# Patient Record
Sex: MALE | Race: WHITE | NOT HISPANIC OR LATINO | Employment: FULL TIME | URBAN - METROPOLITAN AREA
[De-identification: names, ages, dates, MRNs, and addresses within clinical notes are randomized per-mention and may not be internally consistent; named-entity substitution may affect disease eponyms.]

---

## 2020-09-18 ENCOUNTER — PROCEDURE VISIT (OUTPATIENT)
Dept: PAIN MEDICINE | Facility: CLINIC | Age: 55
End: 2020-09-18
Payer: COMMERCIAL

## 2020-09-18 VITALS
DIASTOLIC BLOOD PRESSURE: 91 MMHG | BODY MASS INDEX: 31.16 KG/M2 | TEMPERATURE: 95.6 F | SYSTOLIC BLOOD PRESSURE: 159 MMHG | HEART RATE: 94 BPM | WEIGHT: 210.4 LBS | HEIGHT: 69 IN

## 2020-09-18 DIAGNOSIS — M25.551 HIP PAIN, ACUTE, RIGHT: Primary | ICD-10-CM

## 2020-09-18 DIAGNOSIS — M16.31 OSTEOARTHRITIS RESULTING FROM RIGHT HIP DYSPLASIA: Primary | ICD-10-CM

## 2020-09-18 DIAGNOSIS — M25.551 PAIN IN RIGHT HIP: ICD-10-CM

## 2020-09-18 PROCEDURE — 20611 DRAIN/INJ JOINT/BURSA W/US: CPT | Performed by: ANESTHESIOLOGY

## 2020-09-18 PROCEDURE — 99203 OFFICE O/P NEW LOW 30 MIN: CPT | Performed by: ORTHOPAEDIC SURGERY

## 2020-09-18 RX ORDER — ATORVASTATIN CALCIUM 20 MG/1
20 TABLET, FILM COATED ORAL DAILY
COMMUNITY
Start: 2020-07-30

## 2020-09-18 RX ORDER — METHYLPREDNISOLONE ACETATE 80 MG/ML
80 INJECTION, SUSPENSION INTRA-ARTICULAR; INTRALESIONAL; INTRAMUSCULAR; SOFT TISSUE ONCE
Status: COMPLETED | OUTPATIENT
Start: 2020-09-18 | End: 2020-09-18

## 2020-09-18 RX ORDER — BUPIVACAINE HYDROCHLORIDE 2.5 MG/ML
4 INJECTION, SOLUTION EPIDURAL; INFILTRATION; INTRACAUDAL ONCE
Status: COMPLETED | OUTPATIENT
Start: 2020-09-18 | End: 2020-09-18

## 2020-09-18 RX ADMIN — METHYLPREDNISOLONE ACETATE 80 MG: 80 INJECTION, SUSPENSION INTRA-ARTICULAR; INTRALESIONAL; INTRAMUSCULAR; SOFT TISSUE at 15:40

## 2020-09-18 RX ADMIN — BUPIVACAINE HYDROCHLORIDE 4 ML: 2.5 INJECTION, SOLUTION EPIDURAL; INFILTRATION; INTRACAUDAL at 15:39

## 2020-09-18 NOTE — PROGRESS NOTES
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     2  Pain in right hip       Scribe Attestation    I,:   Logan Pillai am acting as a scribe while in the presence of the attending physician :        I,:   Elisabeth Monroy, DO personally performed the services described in this documentation    as scribed in my presence :          Ayo Anderson is a very pleasant 58-year-old male who presents today for initial evaluation of his chronic right hip pain  After reviewing his imaging and performing a thorough history and physical exam I explained that he is suffering with severe osteoarthritis in his right hip secondary to his underlying hip dysplasia  Based on the progression of his underlying disease and his difficulty with age-appropriate activities of daily living and enjoyment and the demands of his employment I explained that he is indicated for a right total hip arthroplasty and would be an excellent candidate for the direct anterior approach  I provided him with literature regarding this today in the office  We spent time discussing the procedure and the associated recovery  He does state that he would like to move forward with the procedure and has already identified January or February as an ideal time for him  Given the timing, I did offer a corticosteroid injection for symptomatic relief until he is able to undergo the procedure  He understands that following the injection, he will be contraindicated for surgery for three months  Our spine and pain specialist Dr Korey Flowers was able to accommodate him in the office today and will administer an ultrasound-guided right hip intra-articular corticosteroid injection  He will return five weeks prior to his desired surgery date for preoperative examination and surgical consent  All of his questions and concerns were addressed today in the office  We will see him back for preoperative planning and consent      Subjective: Initial evaluation for chronic right hip pain    Patient ID: Raimundo Monroy is a 54 y o  male who presents today for initial evaluation of his chronic right hip pain  He states that this has bothered him regularly for the last three or four years  He describes a sharp pain in his groin that can radiate at times into his upper leg to the medial aspect of his right knee  He finds his pain is worse with activity and only somewhat better at rest   He states that he avoids taking over-the-counter medications  He does work in construction as well as opening a [employer] and finds that his pain does interfere with the demands of his employment  He also struggles with activities of daily living such as dressing  He states today that he feels as though he has no quality of life  He denies any acute injury or trauma  He denies any distal paresthesias of the lower extremities  Review of Systems   Constitutional: Positive for activity change  Negative for chills, fever and unexpected weight change  HENT: Negative for hearing loss, nosebleeds and sore throat  Eyes: Negative for pain, redness and visual disturbance  Respiratory: Negative for cough, shortness of breath and wheezing  Cardiovascular: Negative for chest pain, palpitations and leg swelling  Gastrointestinal: Negative for abdominal pain, nausea and vomiting  Endocrine: Negative for polyphagia and polyuria  Genitourinary: Negative for dysuria and hematuria  Musculoskeletal: Positive for arthralgias and gait problem  Negative for myalgias  See HPI   Skin: Negative for rash and wound  Neurological: Negative for dizziness, numbness and headaches  Psychiatric/Behavioral: Negative for decreased concentration and suicidal ideas  The patient is not nervous/anxious            Past Medical History:   Diagnosis Date    Hypercholesteremia        Past Surgical History:   Procedure Laterality Date    KIDNEY STONE SURGERY Family History   Problem Relation Age of Onset    No Known Problems Mother     No Known Problems Father        Social History     Occupational History    Not on file   Tobacco Use    Smoking status: Never Smoker    Smokeless tobacco: Never Used   Substance and Sexual Activity    Alcohol use: Yes     Comment: occasionally    Drug use: Never    Sexual activity: Not on file         Current Outpatient Medications:     atorvastatin (LIPITOR) 20 mg tablet, Take 20 mg by mouth daily, Disp: , Rfl:     No Known Allergies    Objective:  Vitals:    09/18/20 1441   BP: 159/91   Pulse: 94   Temp: (!) 95 6 °F (35 3 °C)       Body mass index is 31 53 kg/m²  Right Hip Exam     Tenderness   The patient is experiencing tenderness in the anterior  Range of Motion   Right hip abduction: 40  Right hip adduction: 20  Right hip flexion: 100 with pain  Right hip external rotation: 40  Right hip internal rotation: 5  Muscle Strength   Flexion: 4/5     Tests   DEVI: positive    Other   Erythema: absent  Scars: absent  Sensation: normal  Pulse: present    Comments:  No overhanging abdominal pannus  Protuberant abdomen   [place]: positive  FADIR: positive              Physical Exam  Vitals signs and nursing note reviewed  Constitutional:       Appearance: He is well-developed  HENT:      Head: Normocephalic and atraumatic  Eyes:      Conjunctiva/sclera: Conjunctivae normal       Pupils: Pupils are equal, round, and reactive to light  Neck:      Musculoskeletal: Normal range of motion and neck supple  Cardiovascular:      Rate and Rhythm: Normal rate  Pulmonary:      Effort: Pulmonary effort is normal  No respiratory distress  Abdominal:      Palpations: Abdomen is soft  Musculoskeletal:      Comments: As noted in HPI   Skin:     General: Skin is warm and dry  Neurological:      Mental Status: He is alert and oriented to person, place, and time     Psychiatric:         Behavior: Behavior normal          I have personally reviewed pertinent films in PACS  X-ray of the right hip obtained from an outside source dated March 2020 reviewed demonstrating severe degenerative changes secondary to underlying dysplasia with joint space narrowing, sclerosis, and deformity of the femoral head  There is no acute fracture, dislocation, lytic or blastic lesion  X-rays of the left hip obtained at the same time also demonstrate mild underlying dysplasia without associated degenerative change  X-ray of the right knee obtained from an outside source dated March 2020 reviewed demonstrating mild age-appropriate degenerative change with sclerosis and early osteophytosis but well-preserved joint spaces

## 2020-09-18 NOTE — PROGRESS NOTES
ATTENDING PHYSICIAN:  Brooke Metcalf MD      PREPROCEDURE DIAGNOSIS:  Right hip pain  POSTPROCEDURE DIAGNOSIS: Right hip pain  PROCEDURE: Right intraarticular hip injection under ultrasound guidance  ANESTHESIA:  Local     ESTIMATED BLOOD LOSS:  Minimal     COMPLICATIONS:  None  LOCATION:  [facility] [employer] [facility]  CONSENT:  Today's procedure, its risks, benefits, and alternatives were explained in detail to the patient  Risks include, but are not limited bleeding, infection, hematoma formation, abscess formation, weakness, nerve irritation or damage, failure of the pain to improve, or potential worsening of the pain  The patient verbalized understanding and wished to proceed with the procedure  Written informed consent was thereby obtained  DESCRIPTION OF THE PROCEDURE:  After written informed consent was obtained, the patient was taken to the ultrasound suite and placed in the supine position  Anatomical landmarks were identified by way of ultrasound guidance  The patient's hip region was prepped using antiseptic solution and draped in the usual sterile fashion  Strict aseptic technique was utilized  A 25 gauge 2 inch needle was then advanced incrementally under ultrasound guidance towards the femoral neck until os was contacted and the joint space was entered  Subsequently, a solution consisting of 4 ml of 0 5% Bupivacaine mixed with 1 ml of Depo-Medrol 80 mg/ml was injected slowly  All needles were removed with the tips intact and hemostasis was maintained throughout  The patient tolerated the procedure well, and there were no apparent paresthesias or complications noted during or following this procedure  The antiseptic was wiped clean and Band-Aids were placed as appropriate    The patient was observed for an appropriate period of time during which the patient remained hemodynamically stable and neurovascularly intact, as the patient was prior to the procedure  The patient was subsequently discharged to home in stable condition with supervision  The patient was instructed to call the office in a few days for an update  Discharge instructions were provided  I was present and participated in all key and critical portions of this procedure      Nate Baltazar MD  9/18/2020  3:39 PM

## 2020-09-25 ENCOUNTER — TELEPHONE (OUTPATIENT)
Dept: PAIN MEDICINE | Facility: CLINIC | Age: 55
End: 2020-09-25

## 2020-11-19 ENCOUNTER — APPOINTMENT (OUTPATIENT)
Dept: RADIOLOGY | Facility: CLINIC | Age: 55
End: 2020-11-19
Payer: COMMERCIAL

## 2020-11-19 VITALS
HEART RATE: 84 BPM | WEIGHT: 214.2 LBS | DIASTOLIC BLOOD PRESSURE: 80 MMHG | HEIGHT: 69 IN | BODY MASS INDEX: 31.73 KG/M2 | SYSTOLIC BLOOD PRESSURE: 135 MMHG

## 2020-11-19 DIAGNOSIS — M25.551 RIGHT HIP PAIN: ICD-10-CM

## 2020-11-19 DIAGNOSIS — E78.5 HYPERLIPIDEMIA, UNSPECIFIED HYPERLIPIDEMIA TYPE: ICD-10-CM

## 2020-11-19 DIAGNOSIS — M16.31 OSTEOARTHRITIS RESULTING FROM RIGHT HIP DYSPLASIA: Primary | ICD-10-CM

## 2020-11-19 DIAGNOSIS — Z11.59 SCREENING FOR VIRAL DISEASE: ICD-10-CM

## 2020-11-19 DIAGNOSIS — Z01.812 PRE-OPERATIVE LABORATORY EXAMINATION: ICD-10-CM

## 2020-11-19 DIAGNOSIS — M16.31 OSTEOARTHRITIS RESULTING FROM RIGHT HIP DYSPLASIA: ICD-10-CM

## 2020-11-19 PROCEDURE — 99214 OFFICE O/P EST MOD 30 MIN: CPT | Performed by: ORTHOPAEDIC SURGERY

## 2020-11-19 PROCEDURE — 86901 BLOOD TYPING SEROLOGIC RH(D): CPT | Performed by: ORTHOPAEDIC SURGERY

## 2020-11-19 PROCEDURE — 73502 X-RAY EXAM HIP UNI 2-3 VIEWS: CPT

## 2020-11-19 PROCEDURE — 86900 BLOOD TYPING SEROLOGIC ABO: CPT | Performed by: ORTHOPAEDIC SURGERY

## 2020-11-19 PROCEDURE — 86850 RBC ANTIBODY SCREEN: CPT | Performed by: ORTHOPAEDIC SURGERY

## 2020-11-19 RX ORDER — FOLIC ACID 1 MG/1
1 TABLET ORAL DAILY
Qty: 30 TABLET | Refills: 0 | Status: SHIPPED | OUTPATIENT
Start: 2020-11-19 | End: 2021-02-09 | Stop reason: HOSPADM

## 2020-11-19 RX ORDER — ZINC SULFATE 50(220)MG
220 CAPSULE ORAL DAILY
Qty: 30 CAPSULE | Refills: 0 | Status: SHIPPED | OUTPATIENT
Start: 2020-11-19 | End: 2021-02-09 | Stop reason: HOSPADM

## 2020-11-19 RX ORDER — CHLORHEXIDINE GLUCONATE 0.12 MG/ML
15 RINSE ORAL ONCE
Status: CANCELLED | OUTPATIENT
Start: 2021-02-08 | End: 2020-11-19

## 2020-11-19 RX ORDER — MELATONIN
1000 DAILY
Qty: 30 TABLET | Refills: 0 | Status: SHIPPED | OUTPATIENT
Start: 2020-11-19 | End: 2021-02-09 | Stop reason: HOSPADM

## 2020-11-19 RX ORDER — FERROUS SULFATE TAB EC 324 MG (65 MG FE EQUIVALENT) 324 (65 FE) MG
324 TABLET DELAYED RESPONSE ORAL
Qty: 30 TABLET | Refills: 0 | Status: SHIPPED | OUTPATIENT
Start: 2020-11-19 | End: 2021-02-09 | Stop reason: HOSPADM

## 2020-12-05 LAB
ABO GROUP BLD: NORMAL
ALBUMIN SERPL-MCNC: 5.1 G/DL (ref 3.8–4.9)
ALBUMIN/GLOB SERPL: 2.2 {RATIO} (ref 1.2–2.2)
ALP SERPL-CCNC: 54 IU/L (ref 39–117)
ALT SERPL-CCNC: 37 IU/L (ref 0–44)
APTT PPP: 25 SEC (ref 24–33)
AST SERPL-CCNC: 27 IU/L (ref 0–40)
BASOPHILS # BLD AUTO: 0 X10E3/UL (ref 0–0.2)
BASOPHILS NFR BLD AUTO: 0 %
BILIRUB SERPL-MCNC: 0.5 MG/DL (ref 0–1.2)
BLD GP AB SCN SERPL QL: NEGATIVE
BUN SERPL-MCNC: 15 MG/DL (ref 6–24)
BUN/CREAT SERPL: 16 (ref 9–20)
CALCIUM SERPL-MCNC: 9.8 MG/DL (ref 8.7–10.2)
CHLORIDE SERPL-SCNC: 101 MMOL/L (ref 96–106)
CO2 SERPL-SCNC: 27 MMOL/L (ref 20–29)
CREAT SERPL-MCNC: 0.91 MG/DL (ref 0.76–1.27)
EOSINOPHIL # BLD AUTO: 0.1 X10E3/UL (ref 0–0.4)
EOSINOPHIL NFR BLD AUTO: 2 %
ERYTHROCYTE [DISTWIDTH] IN BLOOD BY AUTOMATED COUNT: 12.1 % (ref 11.6–15.4)
GLOBULIN SER-MCNC: 2.3 G/DL (ref 1.5–4.5)
GLUCOSE SERPL-MCNC: 100 MG/DL (ref 65–99)
HCT VFR BLD AUTO: 45.8 % (ref 37.5–51)
HGB BLD-MCNC: 15.7 G/DL (ref 13–17.7)
IMM GRANULOCYTES # BLD: 0 X10E3/UL (ref 0–0.1)
IMM GRANULOCYTES NFR BLD: 0 %
INR PPP: 1 (ref 0.9–1.2)
LYMPHOCYTES # BLD AUTO: 2.3 X10E3/UL (ref 0.7–3.1)
LYMPHOCYTES NFR BLD AUTO: 40 %
MCH RBC QN AUTO: 30.9 PG (ref 26.6–33)
MCHC RBC AUTO-ENTMCNC: 34.3 G/DL (ref 31.5–35.7)
MCV RBC AUTO: 90 FL (ref 79–97)
MONOCYTES # BLD AUTO: 0.4 X10E3/UL (ref 0.1–0.9)
MONOCYTES NFR BLD AUTO: 7 %
NEUTROPHILS # BLD AUTO: 3 X10E3/UL (ref 1.4–7)
NEUTROPHILS NFR BLD AUTO: 51 %
PLATELET # BLD AUTO: 164 X10E3/UL (ref 150–450)
POTASSIUM SERPL-SCNC: 4.7 MMOL/L (ref 3.5–5.2)
PROT SERPL-MCNC: 7.4 G/DL (ref 6–8.5)
PROTHROMBIN TIME: 10.6 SEC (ref 9.1–12)
RBC # BLD AUTO: 5.08 X10E6/UL (ref 4.14–5.8)
RH BLD: POSITIVE
SL AMB EGFR AFRICAN AMERICAN: 109 ML/MIN/1.73
SL AMB EGFR NON AFRICAN AMERICAN: 95 ML/MIN/1.73
SODIUM SERPL-SCNC: 141 MMOL/L (ref 134–144)
WBC # BLD AUTO: 5.8 X10E3/UL (ref 3.4–10.8)

## 2020-12-08 ENCOUNTER — TELEPHONE (OUTPATIENT)
Dept: OBGYN CLINIC | Facility: CLINIC | Age: 55
End: 2020-12-08

## 2020-12-21 ENCOUNTER — ANESTHESIA EVENT (OUTPATIENT)
Dept: PERIOP | Facility: HOSPITAL | Age: 55
End: 2020-12-21
Payer: COMMERCIAL

## 2020-12-29 ENCOUNTER — HOSPITAL ENCOUNTER (OUTPATIENT)
Dept: RADIOLOGY | Facility: HOSPITAL | Age: 55
Discharge: HOME | End: 2020-12-29
Payer: COMMERCIAL

## 2020-12-29 ENCOUNTER — OFFICE VISIT (OUTPATIENT)
Dept: LAB | Facility: HOSPITAL | Age: 55
End: 2020-12-29
Payer: COMMERCIAL

## 2020-12-29 ENCOUNTER — APPOINTMENT (OUTPATIENT)
Dept: LAB | Facility: HOSPITAL | Age: 55
End: 2020-12-29
Attending: ORTHOPAEDIC SURGERY
Payer: COMMERCIAL

## 2020-12-29 ENCOUNTER — TRANSCRIBE ORDERS (OUTPATIENT)
Dept: ADMINISTRATIVE | Facility: HOSPITAL | Age: 55
End: 2020-12-29

## 2020-12-29 ENCOUNTER — APPOINTMENT (OUTPATIENT)
Dept: PREADMISSION TESTING | Facility: HOSPITAL | Age: 55
End: 2020-12-29
Payer: COMMERCIAL

## 2020-12-29 DIAGNOSIS — Z01.818 PREOP TESTING: Primary | ICD-10-CM

## 2020-12-29 DIAGNOSIS — M16.31 OSTEOARTHRITIS RESULTING FROM RIGHT HIP DYSPLASIA: ICD-10-CM

## 2020-12-29 DIAGNOSIS — Z01.818 PREOP TESTING: ICD-10-CM

## 2020-12-29 DIAGNOSIS — M25.551 RIGHT HIP PAIN: ICD-10-CM

## 2020-12-29 DIAGNOSIS — Z01.812 PRE-OPERATIVE LABORATORY EXAMINATION: ICD-10-CM

## 2020-12-29 LAB
ATRIAL RATE: 69 BPM
EST. AVERAGE GLUCOSE BLD GHB EST-MCNC: 111 MG/DL
HBA1C MFR BLD: 5.5 %
P AXIS: 45 DEGREES
PR INTERVAL: 146 MS
QRS AXIS: 53 DEGREES
QRSD INTERVAL: 104 MS
QT INTERVAL: 398 MS
QTC INTERVAL: 426 MS
T WAVE AXIS: 21 DEGREES
VENTRICULAR RATE: 69 BPM

## 2020-12-29 PROCEDURE — 93005 ELECTROCARDIOGRAM TRACING: CPT

## 2020-12-29 PROCEDURE — 36415 COLL VENOUS BLD VENIPUNCTURE: CPT

## 2020-12-29 PROCEDURE — 71046 X-RAY EXAM CHEST 2 VIEWS: CPT

## 2020-12-29 PROCEDURE — 87081 CULTURE SCREEN ONLY: CPT

## 2020-12-29 PROCEDURE — 83036 HEMOGLOBIN GLYCOSYLATED A1C: CPT

## 2020-12-29 PROCEDURE — 93010 ELECTROCARDIOGRAM REPORT: CPT | Performed by: INTERNAL MEDICINE

## 2020-12-29 NOTE — PRE-PROCEDURE INSTRUCTIONS
My Surgical Experience    The following information was developed to assist you to prepare for your operation  What do I need to do before coming to the hospital?   Arrange for a responsible person to drive you to and from the hospital    Arrange care for your children at home  Children are not allowed in the recovery areas of the hospital   Plan to wear clothing that is easy to put on and take off  If you are having shoulder surgery, wear a shirt that buttons or zippers in the front  Bathing  o Shower the evening before and the morning of your surgery with an antibacterial soap  Please refer to the Pre Op Showering Instructions for Surgery Patients Sheet   o Remove nail polish and all body piercing jewelry  o Do not shave any body part for at least 24 hours before surgery-this includes face, arms, legs and upper body  Food  o Nothing to eat or drink after midnight the night before your surgery  This includes candy and chewing gum  o Exception: If your surgery is after 12:00pm (noon), you may have clear liquids such as 7-Up®, ginger ale, apple or cranberry juice, Jell-O®, water, or clear broth until 8:00 am  o Do not drink milk or juice with pulp on the morning before surgery  o Do not drink alcohol 24 hours before surgery  Medicine  o Follow instructions you received from your surgeon about which medicines you may take on the day of surgery  o If instructed to take medicine on the morning of surgery, take pills with just a small sip of water  Call your prescribing doctor for specific infroamtion on what to do if you take insulin    What should I bring to the hospital?    Bring:  Orest Pepper or a walker, if you have them, for foot or knee surgery   A list of the daily medicines, vitamins, minerals, herbals and nutritional supplements you take   Include the dosages of medicines and the time you take them each day   Glasses, dentures or hearing aids   Minimal clothing; you will be wearing hospital sleepwear   Photo ID; required to verify your identity   If you have a Living Will or Power of , bring a copy of the documents   If you have an ostomy, bring an extra pouch and any supplies you use    Do not bring   Medicines or inhalers   Money, valuables or jewelry    What other information should I know about the day of surgery?  Notify your surgeons if you develop a cold, sore throat, cough, fever, rash or any other illness   Report to the Ambulatory Surgical/Same Day Surgery Unit   You will be instructed to stop at Registration only if you have not been pre-registered   Inform your  fi they do not stay that they will be asked by the staff to leave a phone number where they can be reached   Be available to be reached before surgery  In the event the operating room schedule changes, you may be asked to come in earlier or later than expected    *It is important to tell your doctor and others involved in your health care if you are taking or have been taking any non-prescription drugs, vitamins, minerals, herbals or other nutritional supplements  Any of these may interact with some food or medicines and cause a reaction      Pre-Surgery Instructions:   Medication Instructions    ascorbic Acid (VITAMIN C) 500 MG CPCR Instructed patient per Anesthesia Guidelines   atorvastatin (LIPITOR) 20 mg tablet Instructed patient per Anesthesia Guidelines   cholecalciferol (VITAMIN D3) 1,000 units tablet Instructed patient per Anesthesia Guidelines   ferrous sulfate 324 (65 Fe) mg Instructed patient per Anesthesia Guidelines   folic acid (FOLVITE) 1 mg tablet Instructed patient per Anesthesia Guidelines   zinc sulfate (ZINCATE) 220 mg capsule Instructed patient per Anesthesia Guidelines

## 2020-12-30 LAB — MRSA NOSE QL CULT: NORMAL

## 2021-01-04 RX ORDER — DIPHENHYDRAMINE HCL 25 MG
25 TABLET ORAL 3 TIMES DAILY
COMMUNITY
End: 2021-02-09 | Stop reason: HOSPADM

## 2021-01-12 ENCOUNTER — ANESTHESIA (OUTPATIENT)
Dept: PERIOP | Facility: HOSPITAL | Age: 56
End: 2021-01-12
Payer: COMMERCIAL

## 2021-02-02 NOTE — PRE-PROCEDURE INSTRUCTIONS
My Surgical Experience    The following information was developed to assist you to prepare for your operation  What do I need to do before coming to the hospital?   Arrange for a responsible person to drive you to and from the hospital    Arrange care for your children at home  Children are not allowed in the recovery areas of the hospital   Plan to wear clothing that is easy to put on and take off  If you are having shoulder surgery, wear a shirt that buttons or zippers in the front  Bathing  o Shower the evening before and the morning of your surgery with an antibacterial soap  Please refer to the Pre Op Showering Instructions for Surgery Patients Sheet   o Remove nail polish and all body piercing jewelry  o Do not shave any body part for at least 24 hours before surgery-this includes face, arms, legs and upper body  Food  o Nothing to eat or drink after midnight the night before your surgery  This includes candy and chewing gum  o Exception: If your surgery is after 12:00pm (noon), you may have clear liquids such as 7-Up®, ginger ale, apple or cranberry juice, Jell-O®, water, or clear broth until 8:00 am  o Do not drink milk or juice with pulp on the morning before surgery  o Do not drink alcohol 24 hours before surgery  Medicine  o Follow instructions you received from your surgeon about which medicines you may take on the day of surgery  o If instructed to take medicine on the morning of surgery, take pills with just a small sip of water  Call your prescribing doctor for specific infroamtion on what to do if you take insulin    What should I bring to the hospital?    Bring:  Kobe Castañeda or a walker, if you have them, for foot or knee surgery   A list of the daily medicines, vitamins, minerals, herbals and nutritional supplements you take   Include the dosages of medicines and the time you take them each day   Glasses, dentures or hearing aids   Minimal clothing; you will be wearing hospital sleepwear   Photo ID; required to verify your identity   If you have a Living Will or Power of , bring a copy of the documents   If you have an ostomy, bring an extra pouch and any supplies you use    Do not bring   Medicines or inhalers   Money, valuables or jewelry    What other information should I know about the day of surgery?  Notify your surgeons if you develop a cold, sore throat, cough, fever, rash or any other illness   Report to the Ambulatory Surgical/Same Day Surgery Unit   You will be instructed to stop at Registration only if you have not been pre-registered   Inform your  fi they do not stay that they will be asked by the staff to leave a phone number where they can be reached   Be available to be reached before surgery  In the event the operating room schedule changes, you may be asked to come in earlier or later than expected    *It is important to tell your doctor and others involved in your health care if you are taking or have been taking any non-prescription drugs, vitamins, minerals, herbals or other nutritional supplements  Any of these may interact with some food or medicines and cause a reaction      Pre-Surgery Instructions:   Medication Instructions    ascorbic Acid (VITAMIN C) 500 MG CPCR Instructed patient per Anesthesia Guidelines   atorvastatin (LIPITOR) 20 mg tablet Instructed patient per Anesthesia Guidelines   cholecalciferol (VITAMIN D3) 1,000 units tablet Instructed patient per Anesthesia Guidelines   diphenhydrAMINE (BENADRYL) 25 mg tablet Instructed patient per Anesthesia Guidelines   ferrous sulfate 324 (65 Fe) mg Instructed patient per Anesthesia Guidelines   folic acid (FOLVITE) 1 mg tablet Instructed patient per Anesthesia Guidelines   zinc sulfate (ZINCATE) 220 mg capsule Instructed patient per Anesthesia Guidelines

## 2021-02-03 VITALS
HEART RATE: 90 BPM | BODY MASS INDEX: 30.66 KG/M2 | DIASTOLIC BLOOD PRESSURE: 80 MMHG | SYSTOLIC BLOOD PRESSURE: 138 MMHG | WEIGHT: 207 LBS | HEIGHT: 69 IN

## 2021-02-03 DIAGNOSIS — Z86.16 PERSONAL HISTORY OF COVID-19: ICD-10-CM

## 2021-02-03 DIAGNOSIS — M25.551 RIGHT HIP PAIN: ICD-10-CM

## 2021-02-03 DIAGNOSIS — M16.31 OSTEOARTHRITIS RESULTING FROM RIGHT HIP DYSPLASIA: Primary | ICD-10-CM

## 2021-02-03 PROCEDURE — 99213 OFFICE O/P EST LOW 20 MIN: CPT | Performed by: ORTHOPAEDIC SURGERY
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
## 2021-02-05 ENCOUNTER — TELEPHONE (OUTPATIENT)
Dept: OBGYN CLINIC | Facility: HOSPITAL | Age: 56
End: 2021-02-05

## 2021-02-05 RX ORDER — CHLORHEXIDINE GLUCONATE 0.12 MG/ML
15 RINSE ORAL ONCE
Status: CANCELLED | OUTPATIENT
Start: 2021-02-08 | End: 2021-02-05

## 2021-02-05 NOTE — TELEPHONE ENCOUNTER
Patient has surgery schedule with Dr Azar Schultz on Monday, 2/08 and he was given today a form of clearance from PCP  He was told that they sent the form to Dr Azar Schultz, and he just want to know, if it was received?    # [phone]

## 2021-02-07 PROBLEM — M19.90 ARTHRITIS: Status: ACTIVE | Noted: 2021-02-07

## 2021-02-07 NOTE — ANESTHESIA PREPROCEDURE EVALUATION
Procedure:  ARTHROPLASTY HIP TOTAL ANTERIOR (Right Hip)    Relevant Problems   CARDIO   (+) Hyperlipidemia      MUSCULOSKELETAL   (+) Arthritis   (+) 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        Physical Exam    Airway    Mallampati score: II  TM Distance: >3 FB  Neck ROM: full     Dental       Cardiovascular  Rhythm: regular, Rate: normal,     Pulmonary  Breath sounds clear to auscultation,     Other Findings        Anesthesia Plan  ASA Score- 2     Anesthesia Type- IV sedation with anesthesia, regional and spinal with ASA Monitors  Additional Monitors:   Airway Plan:     Comment: Right fascia iliaca block with exparel  Plan Factors-    Chart reviewed  Patient is not a current smoker  Induction- intravenous  Postoperative Plan- Plan for postoperative opioid use  Informed Consent- Anesthetic plan and risks discussed with patient  I personally reviewed this patient with the CRNA  Discussed and agreed on the Anesthesia Plan with the CRNA  Kelly Chaney

## 2021-02-08 ENCOUNTER — APPOINTMENT (OUTPATIENT)
Dept: RADIOLOGY | Facility: HOSPITAL | Age: 56
End: 2021-02-08
Payer: COMMERCIAL

## 2021-02-08 ENCOUNTER — HOSPITAL ENCOUNTER (OUTPATIENT)
Facility: HOSPITAL | Age: 56
Setting detail: OUTPATIENT SURGERY
Discharge: HOME | End: 2021-02-09
Attending: ORTHOPAEDIC SURGERY | Admitting: ORTHOPAEDIC SURGERY
Payer: COMMERCIAL

## 2021-02-08 VITALS — HEART RATE: 84 BPM

## 2021-02-08 DIAGNOSIS — Z96.641 STATUS POST HIP REPLACEMENT, RIGHT: Primary | ICD-10-CM

## 2021-02-08 LAB
ABO GROUP BLD: NORMAL
ABO GROUP BLD: NORMAL
BLD GP AB SCN SERPL QL: NEGATIVE
RH BLD: POSITIVE
RH BLD: POSITIVE
SPECIMEN EXPIRATION DATE: NORMAL

## 2021-02-08 PROCEDURE — 97530 THERAPEUTIC ACTIVITIES: CPT

## 2021-02-08 PROCEDURE — C1776 JOINT DEVICE (IMPLANTABLE): HCPCS | Performed by: ORTHOPAEDIC SURGERY

## 2021-02-08 PROCEDURE — 86850 RBC ANTIBODY SCREEN: CPT | Performed by: ORTHOPAEDIC SURGERY

## 2021-02-08 PROCEDURE — 27130 TOTAL HIP ARTHROPLASTY: CPT | Performed by: PHYSICIAN ASSISTANT

## 2021-02-08 PROCEDURE — 86901 BLOOD TYPING SEROLOGIC RH(D): CPT | Performed by: ORTHOPAEDIC SURGERY

## 2021-02-08 PROCEDURE — 27130 TOTAL HIP ARTHROPLASTY: CPT | Performed by: ORTHOPAEDIC SURGERY

## 2021-02-08 PROCEDURE — 73501 X-RAY EXAM HIP UNI 1 VIEW: CPT

## 2021-02-08 PROCEDURE — 97163 PT EVAL HIGH COMPLEX 45 MIN: CPT

## 2021-02-08 PROCEDURE — 99024 POSTOP FOLLOW-UP VISIT: CPT | Performed by: ORTHOPAEDIC SURGERY

## 2021-02-08 PROCEDURE — C9290 INJ, BUPIVACAINE LIPOSOME: HCPCS | Performed by: ANESTHESIOLOGY

## 2021-02-08 PROCEDURE — 86900 BLOOD TYPING SEROLOGIC ABO: CPT | Performed by: ORTHOPAEDIC SURGERY

## 2021-02-08 DEVICE — BIOLOX DELTA CERAMIC FEMORAL HEAD +5.0 36MM DIA 12/14 TAPER
Type: IMPLANTABLE DEVICE | Site: HIP | Status: FUNCTIONAL
Brand: BIOLOX DELTA

## 2021-02-08 DEVICE — ACTIS DUOFIX HIP PROSTHESIS (FEMORAL STEM 12/14 TAPER CEMENTLESS SIZE 5 STD COLLAR)  CE
Type: IMPLANTABLE DEVICE | Site: HIP | Status: FUNCTIONAL
Brand: ACTIS

## 2021-02-08 DEVICE — APEX HOLE ELIMINATOR - PS
Type: IMPLANTABLE DEVICE | Site: HIP | Status: FUNCTIONAL
Brand: APEX

## 2021-02-08 DEVICE — PINNACLE POROCOAT ACETABULAR SHELL SECTOR II 54MM OD
Type: IMPLANTABLE DEVICE | Site: HIP | Status: FUNCTIONAL
Brand: PINNACLE POROCOAT

## 2021-02-08 DEVICE — PINNACLE HIP SOLUTIONS ALTRX POLYETHYLENE ACETABULAR LINER NEUTRAL 36MM ID 54MM OD
Type: IMPLANTABLE DEVICE | Site: HIP | Status: FUNCTIONAL
Brand: PINNACLE ALTRX

## 2021-02-08 RX ORDER — MAGNESIUM HYDROXIDE/ALUMINUM HYDROXICE/SIMETHICONE 120; 1200; 1200 MG/30ML; MG/30ML; MG/30ML
30 SUSPENSION ORAL EVERY 6 HOURS PRN
Status: DISCONTINUED | OUTPATIENT
Start: 2021-02-08 | End: 2021-02-09 | Stop reason: HOSPADM

## 2021-02-08 RX ORDER — ONDANSETRON 2 MG/ML
4 INJECTION INTRAMUSCULAR; INTRAVENOUS EVERY 6 HOURS PRN
Status: DISCONTINUED | OUTPATIENT
Start: 2021-02-08 | End: 2021-02-09 | Stop reason: HOSPADM

## 2021-02-08 RX ORDER — CEFAZOLIN SODIUM 2 G/50ML
2000 SOLUTION INTRAVENOUS EVERY 8 HOURS
Status: COMPLETED | OUTPATIENT
Start: 2021-02-08 | End: 2021-02-09

## 2021-02-08 RX ORDER — ATORVASTATIN CALCIUM 20 MG/1
20 TABLET, FILM COATED ORAL
Status: DISCONTINUED | OUTPATIENT
Start: 2021-02-08 | End: 2021-02-09 | Stop reason: HOSPADM

## 2021-02-08 RX ORDER — OXYCODONE HYDROCHLORIDE 5 MG/1
5 TABLET ORAL EVERY 4 HOURS PRN
Status: DISCONTINUED | OUTPATIENT
Start: 2021-02-08 | End: 2021-02-09 | Stop reason: HOSPADM

## 2021-02-08 RX ORDER — DOCUSATE SODIUM 100 MG/1
100 CAPSULE, LIQUID FILLED ORAL 2 TIMES DAILY
Status: DISCONTINUED | OUTPATIENT
Start: 2021-02-08 | End: 2021-02-09 | Stop reason: HOSPADM

## 2021-02-08 RX ORDER — CEFAZOLIN SODIUM 2 G/50ML
2000 SOLUTION INTRAVENOUS ONCE
Status: COMPLETED | OUTPATIENT
Start: 2021-02-08 | End: 2021-02-08

## 2021-02-08 RX ORDER — ACETAMINOPHEN 325 MG/1
975 TABLET ORAL ONCE
Status: COMPLETED | OUTPATIENT
Start: 2021-02-08 | End: 2021-02-08

## 2021-02-08 RX ORDER — OXYCODONE HCL 10 MG/1
10 TABLET, FILM COATED, EXTENDED RELEASE ORAL ONCE
Status: COMPLETED | OUTPATIENT
Start: 2021-02-08 | End: 2021-02-08

## 2021-02-08 RX ORDER — BUPIVACAINE HYDROCHLORIDE 5 MG/ML
INJECTION, SOLUTION PERINEURAL
Status: DISCONTINUED | OUTPATIENT
Start: 2021-02-08 | End: 2021-02-08

## 2021-02-08 RX ORDER — CHLORHEXIDINE GLUCONATE 0.12 MG/ML
15 RINSE ORAL ONCE
Status: COMPLETED | OUTPATIENT
Start: 2021-02-08 | End: 2021-02-08

## 2021-02-08 RX ORDER — GABAPENTIN 300 MG/1
300 CAPSULE ORAL ONCE
Status: COMPLETED | OUTPATIENT
Start: 2021-02-08 | End: 2021-02-08

## 2021-02-08 RX ORDER — LIDOCAINE HYDROCHLORIDE 10 MG/ML
INJECTION, SOLUTION EPIDURAL; INFILTRATION; INTRACAUDAL; PERINEURAL AS NEEDED
Status: DISCONTINUED | OUTPATIENT
Start: 2021-02-08 | End: 2021-02-08

## 2021-02-08 RX ORDER — SCOLOPAMINE TRANSDERMAL SYSTEM 1 MG/1
1 PATCH, EXTENDED RELEASE TRANSDERMAL ONCE
Status: DISCONTINUED | OUTPATIENT
Start: 2021-02-08 | End: 2021-02-09 | Stop reason: HOSPADM

## 2021-02-08 RX ORDER — HYDROMORPHONE HCL/PF 1 MG/ML
0.5 SYRINGE (ML) INJECTION EVERY 2 HOUR PRN
Status: DISCONTINUED | OUTPATIENT
Start: 2021-02-08 | End: 2021-02-09 | Stop reason: HOSPADM

## 2021-02-08 RX ORDER — BUPIVACAINE HYDROCHLORIDE 7.5 MG/ML
INJECTION, SOLUTION INTRASPINAL AS NEEDED
Status: DISCONTINUED | OUTPATIENT
Start: 2021-02-08 | End: 2021-02-08

## 2021-02-08 RX ORDER — MAGNESIUM HYDROXIDE 1200 MG/15ML
LIQUID ORAL AS NEEDED
Status: DISCONTINUED | OUTPATIENT
Start: 2021-02-08 | End: 2021-02-08 | Stop reason: HOSPADM

## 2021-02-08 RX ORDER — SODIUM CHLORIDE 9 MG/ML
75 INJECTION, SOLUTION INTRAVENOUS CONTINUOUS
Status: DISCONTINUED | OUTPATIENT
Start: 2021-02-08 | End: 2021-02-08

## 2021-02-08 RX ORDER — ACETAMINOPHEN 325 MG/1
975 TABLET ORAL EVERY 8 HOURS
Status: DISCONTINUED | OUTPATIENT
Start: 2021-02-08 | End: 2021-02-09 | Stop reason: HOSPADM

## 2021-02-08 RX ORDER — BISACODYL 10 MG
10 SUPPOSITORY, RECTAL RECTAL DAILY PRN
Status: DISCONTINUED | OUTPATIENT
Start: 2021-02-08 | End: 2021-02-09 | Stop reason: HOSPADM

## 2021-02-08 RX ORDER — MIDAZOLAM HYDROCHLORIDE 2 MG/2ML
INJECTION, SOLUTION INTRAMUSCULAR; INTRAVENOUS AS NEEDED
Status: DISCONTINUED | OUTPATIENT
Start: 2021-02-08 | End: 2021-02-08

## 2021-02-08 RX ORDER — OXYCODONE HYDROCHLORIDE 10 MG/1
10 TABLET ORAL EVERY 4 HOURS PRN
Status: DISCONTINUED | OUTPATIENT
Start: 2021-02-08 | End: 2021-02-09 | Stop reason: HOSPADM

## 2021-02-08 RX ORDER — MEPERIDINE HYDROCHLORIDE 25 MG/ML
12.5 INJECTION INTRAMUSCULAR; INTRAVENOUS; SUBCUTANEOUS
Status: DISCONTINUED | OUTPATIENT
Start: 2021-02-08 | End: 2021-02-08 | Stop reason: HOSPADM

## 2021-02-08 RX ORDER — DEXAMETHASONE SODIUM PHOSPHATE 4 MG/ML
INJECTION, SOLUTION INTRA-ARTICULAR; INTRALESIONAL; INTRAMUSCULAR; INTRAVENOUS; SOFT TISSUE AS NEEDED
Status: DISCONTINUED | OUTPATIENT
Start: 2021-02-08 | End: 2021-02-08

## 2021-02-08 RX ORDER — SODIUM CHLORIDE 9 MG/ML
75 INJECTION, SOLUTION INTRAVENOUS CONTINUOUS
Status: DISCONTINUED | OUTPATIENT
Start: 2021-02-08 | End: 2021-02-09 | Stop reason: ALTCHOICE

## 2021-02-08 RX ORDER — ASPIRIN 325 MG
325 TABLET ORAL 2 TIMES DAILY
Status: DISCONTINUED | OUTPATIENT
Start: 2021-02-08 | End: 2021-02-09 | Stop reason: HOSPADM

## 2021-02-08 RX ORDER — GABAPENTIN 100 MG/1
200 CAPSULE ORAL 2 TIMES DAILY
Status: DISCONTINUED | OUTPATIENT
Start: 2021-02-08 | End: 2021-02-09 | Stop reason: HOSPADM

## 2021-02-08 RX ORDER — FENTANYL CITRATE/PF 50 MCG/ML
25 SYRINGE (ML) INJECTION
Status: DISCONTINUED | OUTPATIENT
Start: 2021-02-08 | End: 2021-02-08 | Stop reason: HOSPADM

## 2021-02-08 RX ORDER — ONDANSETRON 2 MG/ML
4 INJECTION INTRAMUSCULAR; INTRAVENOUS ONCE AS NEEDED
Status: DISCONTINUED | OUTPATIENT
Start: 2021-02-08 | End: 2021-02-08 | Stop reason: HOSPADM

## 2021-02-08 RX ORDER — ONDANSETRON 2 MG/ML
INJECTION INTRAMUSCULAR; INTRAVENOUS AS NEEDED
Status: DISCONTINUED | OUTPATIENT
Start: 2021-02-08 | End: 2021-02-08

## 2021-02-08 RX ORDER — PANTOPRAZOLE SODIUM 40 MG/1
40 TABLET, DELAYED RELEASE ORAL DAILY
Status: DISCONTINUED | OUTPATIENT
Start: 2021-02-08 | End: 2021-02-09 | Stop reason: HOSPADM

## 2021-02-08 RX ORDER — CELECOXIB 100 MG/1
100 CAPSULE ORAL 2 TIMES DAILY
Status: DISCONTINUED | OUTPATIENT
Start: 2021-02-08 | End: 2021-02-09 | Stop reason: HOSPADM

## 2021-02-08 RX ORDER — PROPOFOL 10 MG/ML
INJECTION, EMULSION INTRAVENOUS AS NEEDED
Status: DISCONTINUED | OUTPATIENT
Start: 2021-02-08 | End: 2021-02-08

## 2021-02-08 RX ORDER — DEXAMETHASONE SODIUM PHOSPHATE 4 MG/ML
10 INJECTION, SOLUTION INTRA-ARTICULAR; INTRALESIONAL; INTRAMUSCULAR; INTRAVENOUS; SOFT TISSUE ONCE
Status: COMPLETED | OUTPATIENT
Start: 2021-02-09 | End: 2021-02-09

## 2021-02-08 RX ORDER — PROPOFOL 10 MG/ML
INJECTION, EMULSION INTRAVENOUS CONTINUOUS PRN
Status: DISCONTINUED | OUTPATIENT
Start: 2021-02-08 | End: 2021-02-08

## 2021-02-08 RX ADMIN — CEFAZOLIN SODIUM 2000 MG: 2 SOLUTION INTRAVENOUS at 16:22

## 2021-02-08 RX ADMIN — PANTOPRAZOLE SODIUM 40 MG: 40 TABLET, DELAYED RELEASE ORAL at 12:09

## 2021-02-08 RX ADMIN — GABAPENTIN 300 MG: 300 CAPSULE ORAL at 06:19

## 2021-02-08 RX ADMIN — PROPOFOL 100 MCG/KG/MIN: 10 INJECTION, EMULSION INTRAVENOUS at 07:35

## 2021-02-08 RX ADMIN — CELECOXIB 100 MG: 100 CAPSULE ORAL at 17:16

## 2021-02-08 RX ADMIN — PROPOFOL 90 MG: 10 INJECTION, EMULSION INTRAVENOUS at 07:34

## 2021-02-08 RX ADMIN — BUPIVACAINE HYDROCHLORIDE 5 ML: 5 INJECTION, SOLUTION PERINEURAL at 11:00

## 2021-02-08 RX ADMIN — DOCUSATE SODIUM 100 MG: 100 CAPSULE, LIQUID FILLED ORAL at 17:16

## 2021-02-08 RX ADMIN — OXYCODONE HYDROCHLORIDE 10 MG: 10 TABLET ORAL at 18:57

## 2021-02-08 RX ADMIN — SCOPALAMINE 1 PATCH: 1 PATCH, EXTENDED RELEASE TRANSDERMAL at 06:21

## 2021-02-08 RX ADMIN — IRON SUCROSE 300 MG: 20 INJECTION, SOLUTION INTRAVENOUS at 13:52

## 2021-02-08 RX ADMIN — DEXAMETHASONE SODIUM PHOSPHATE 4 MG: 4 INJECTION, SOLUTION INTRA-ARTICULAR; INTRALESIONAL; INTRAMUSCULAR; INTRAVENOUS; SOFT TISSUE at 07:41

## 2021-02-08 RX ADMIN — ACETAMINOPHEN 975 MG: 325 TABLET, FILM COATED ORAL at 12:09

## 2021-02-08 RX ADMIN — CHLORHEXIDINE GLUCONATE 0.12% ORAL RINSE 15 ML: 1.2 LIQUID ORAL at 06:20

## 2021-02-08 RX ADMIN — MIDAZOLAM 2 MG: 1 INJECTION INTRAMUSCULAR; INTRAVENOUS at 07:20

## 2021-02-08 RX ADMIN — CEFAZOLIN SODIUM 2000 MG: 2 SOLUTION INTRAVENOUS at 23:31

## 2021-02-08 RX ADMIN — BUPIVACAINE HYDROCHLORIDE IN DEXTROSE 1.8 ML: 7.5 INJECTION, SOLUTION SUBARACHNOID at 07:32

## 2021-02-08 RX ADMIN — ATORVASTATIN CALCIUM 20 MG: 20 TABLET, FILM COATED ORAL at 16:22

## 2021-02-08 RX ADMIN — ASPIRIN 325 MG: 325 TABLET, FILM COATED ORAL at 20:43

## 2021-02-08 RX ADMIN — GABAPENTIN 200 MG: 100 CAPSULE ORAL at 17:16

## 2021-02-08 RX ADMIN — ACETAMINOPHEN 975 MG: 325 TABLET, FILM COATED ORAL at 20:43

## 2021-02-08 RX ADMIN — ACETAMINOPHEN 975 MG: 325 TABLET, FILM COATED ORAL at 06:19

## 2021-02-08 RX ADMIN — DOCUSATE SODIUM 100 MG: 100 CAPSULE, LIQUID FILLED ORAL at 12:09

## 2021-02-08 RX ADMIN — OXYCODONE HYDROCHLORIDE 10 MG: 10 TABLET, FILM COATED, EXTENDED RELEASE ORAL at 06:19

## 2021-02-08 RX ADMIN — ONDANSETRON 4 MG: 2 INJECTION INTRAMUSCULAR; INTRAVENOUS at 07:41

## 2021-02-08 RX ADMIN — GABAPENTIN 200 MG: 100 CAPSULE ORAL at 12:09

## 2021-02-08 RX ADMIN — SODIUM CHLORIDE 75 ML/HR: 0.9 INJECTION, SOLUTION INTRAVENOUS at 06:50

## 2021-02-08 RX ADMIN — SODIUM CHLORIDE 75 ML/HR: 0.9 INJECTION, SOLUTION INTRAVENOUS at 12:10

## 2021-02-08 RX ADMIN — TRANEXAMIC ACID 50 ML: 1 INJECTION, SOLUTION INTRAVENOUS at 07:52

## 2021-02-08 RX ADMIN — LIDOCAINE HYDROCHLORIDE 50 MG: 10 INJECTION, SOLUTION EPIDURAL; INFILTRATION; INTRACAUDAL; PERINEURAL at 07:34

## 2021-02-08 RX ADMIN — CEFAZOLIN SODIUM 2000 MG: 2 SOLUTION INTRAVENOUS at 07:47

## 2021-02-08 NOTE — ANESTHESIA POSTPROCEDURE EVALUATION
Post-Op Assessment Note    CV Status:  Stable  Pain Score: 0    Pain management: adequate     Mental Status:  Awake   Hydration Status:  Stable   PONV Controlled:  None   Airway Patency:  Patent      Post Op Vitals Reviewed: Yes      Staff: CRNA   Comments: spontaneously breathing, HOB @ 30 degrees, simple mask to O2, fully endorsed to recovery w/o AC        No complications documented      BP   96/54   Temp      Pulse  78   Resp   15   SpO2   98

## 2021-02-08 NOTE — OP NOTE
OPERATIVE REPORT  PATIENT NAME: Selin Ruiz    :  1965  MRN: [id number]  Pt Location: [facility] OR ROOM 03    SURGERY DATE: 2021    Surgeon(s) and Role:     * Shelby Matos,  - Primary     * Fady Nation PA-C - Assisting, no qualified resident was available an assistant was needed for patient positioning, prepping and draping, soft tissue retraction, protection of vital structures throughout the case, and to complete the case safely  Farooq Mir,  ATC/OTC - 2nd Assist    Preop Diagnosis:  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 [M16 31]  Right hip pain [M25 551]    Post-Op Diagnosis Codes:     * 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 [M16 31]     * Right hip pain [M25 551]    Procedure(s) (LRB):  ARTHROPLASTY HIP TOTAL ANTERIOR (Right)    Specimen(s):  * No specimens in log *    Estimated Blood Loss:   400 mL    Drains:  None  Urethral Catheter Latex 16 Fr  (Active)   Securement Method Securing device (Describe) 21 1025   Output (mL) 100 mL 21 1101   Number of days: 0       Anesthesia Type:   Spinal with sedation, postoperative fascia iliaca block with Exparel    Antibiotics:  Ancef 2 g    Intravenous fluids:  800 cc    Urine output:  Ferguson:  150 cc    Implants: Depuy [place] Sector 2   54 mm acetabular shell, [place] Altrx 36 mm/54 mm polyethylene liner, apex hole eliminator, Depuy Actis size 5 standard offset, Biolox delta ceramic femoral head +5 36 mm head    Operative Indications:  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 [M16 31]  Right hip pain [M25 551]  Patient is a very pleasant 63-year-old male that is known to me for treatment of his activity-related right hip and groin pain  After thorough history, clinical exam, review of his x-rays he was found to have secondary osteoarthritis that was end-stage due to underlying hip dysplasia    With failure of conservative treatments and limitations in activities due to these findings I recommended he undergo direct anterior right total hip arthroplasty  He was agreeable to this  He was medically optimized for the intended procedure and underwent right total hip arthroplasty on 2/8/2021  Operative Findings:  Intraoperatively there was evidence of hip dysplasia with this shallow vertically oriented acetabulum and mildly dysplastic femoral neck that was in valgus  There was evidence of grade 4 changes on both the femoral head and acetabulum  Ultimately a uncemented acetabular component was successfully implanted in approximately 45° of inclination 25° of anteversion after being appropriately medialized to optimize coverage in the setting of his dysplasia  The final acetabular component had excellent purchase  The polyethylene liner was locked into place  The femoral component was implanted approximately 5° of anteversion  The at its final station had excellent rotational and axial stability  Final construct demonstrated excellent stability with 50° of internal rotation of the hip, 105° of external rotation of the hip, and 50° of external rotation with the leg lowered into extension  On leg length evaluation the leg lengths appeared to be equalized  Patient tolerated procedure well  There were no complications  Complications:   None    Procedure and Technique:  The patient was identified and marked in the preoperative holding area, and the correct operative extremity and intended procedure was confirmed  The consents were visualized and confirmed for correct procedure and laterality  The patient was then taken back to the operating room where there were administered spinal anesthesia by the anesthesia staff  The patient was administered 2 g of Ancef intravenously by the anesthesia staff  The patient was then transferred to the [facility] table for the procedure   After the patient was appropriately positioned, a AP of the pelvis was obtained using fluoroscopy to evaluate preoperative leg lengths  It was found that the right lower extremity was approximately 3 mm shorter than the left lower extremity  There was evidence of underlying acetabular dysplasia as well as 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  The right lower extremity was prepped and draped in a standard sterile fashion  After a timeout was performed and all members of the operating room team were in agreement of the correct patient, and correct intended procedure the bony landmarks were identified using marking pen  Tranexamic acid was administered by anesthesia  A modified Espinosa-Finn incision was delineated obliquely starting 2 cm distal and 2 cm laterally to the ASIS  Sharp dissection was carried down through the subcutaneous tissues to the investing fascia of the tensor fascia doroteo muscle  Electrocautery was used to maintain hemostasis in the subcutaneous tissues  The investing fascia of the tensor fascia doroteo was incised in line with the fibers in this compartment was entered bluntly and the muscle was retracted laterally  The perforating circumflex femoral vessels were carefully identified and cauterized using electrocautery and the Aquamantis  Dissection was then carried down to the right hip capsule, and the pre-capsular fat was excised  A capsulotomy was carried out across the edge of the acetabulum superiorly down the femoral neck and into the intra-articular trochanteric line  Both sides of the capsulotomy were tagged with a Ethibond suture  Dissection was carried out medially around the medial calcar  It was also carried out laterally to expose the saddle  The femoral neck osteotomy was templated and carried out using an oscillating saw and finished with a osteotome in accordance with preoperative templating  The femoral head was extracted from the acetabulum using a corkscrew  There were grade 4 changes diffusely with osteophytes peripherally    In addition prior to osteotomy the femoral neck did appear to have a valgus angle consistent with a dysplasia appreciated on preoperative x-rays  The femoral head was sized  There were grade 4 changes diffusely in the acetabulum  The acetabulum was quite shallow and vertically oriented consistent with the suspicion of dysplasia that was appreciated on preoperative x-rays  The acetabulum was cleaned of debris and pulvinar, the SHERYL was well-visualized, the remaining labrum was removed, and reaming began  In order to obtain optimal coverage of the final implant the hip was appropriately medialized  The acetabulum was reamed starting with a size 52 reamer and carried up in 1-2 mm increments until a size 53 was reached  A size 53 acetabular trial was impacted into the acetabulum and demonstrated an appropriate fit  The appropriate abduction and anteversion of approximately 45° and 25° respectively was confirmed using fluoroscopy  The trial implant was removed, the host bone was touched with a 47 Reamer, and a size 54 [place] Sector 2 cluster hole final acetabular component was impacted into place under direct visualization with fluoroscopy while using the GenoSpace impactor  This demonstrated good scratch fit  This was implanted in approximately 45° of inclination 25° of anteversion and anteriorly was well covered and demonstrated good fit within the host acetabular bone  The final 36 mm/54 mm Altrx highly cross-linked polyethylene insert was impacted into the acetabular component  The liner was locked in its position on visual and tactile inspection  Attention was then turned back to the proximal femur  The appropriate proximal femoral releases were utilized as needed to mobilize the femur, ensuring not to release the obturator externus  The soft tissue was removed from the region between the femoral neck and the greater trochanter a box osteotome was used to cut into the lateral aspect of the proximal femur    The small starter broach was then inserted into the proximal femur adding proximally 5° of anteversion while preparing the proximal femur  Broaching was carried up in sequence until a size 5 broach demonstrated excellent fit and rotational stability  Broaching was performed with the Visual Threatse impactor  The calcar planer was used to bring the femoral neck osteotomy coplanar with the broach  Trialing was performed  A trial construct with a standard neck and a +5 36 mm femoral head showed excellent stability on 105° of external rotation, 50° of internal rotation, and 50° of external rotation with extension of the hip to the floor  Leg lengths were evaluated on fluoroscopy images and the leg lengths were equalized using the transparency overlay method  The trial components were removed from the proximal femur and the final Depuy Actis size 5 standard offset femoral component was inserted into the femur by hand and impacted into place  The trunnion was cleaned and dried and the final Biolox delta ceramic +5 36 mm head was impacted upon the final femoral stem  Hip was reduced and taken through stability testing  The patient demonstrated excellent stability with 105° of external rotation, 50° of internal rotation, and 50° of external rotation and extension of the hip to the floor  There was no lateral subluxation of the hip on manual testing  Leg lengths were again evaluated using fluoroscopy and the leg lengths were equal   The wound was copiously irrigated with normal saline with bacitracin solution, the capsule was closed using a #2 Ethibond suture  The wound was again irrigated  The investing fascia of the tensor fascia doroteo muscle was closed using a bidirectional barbed #2 barbed suture    The deep subcutaneous tissues were reapproximated using an undyed 0 Vicryl, the superficial subcutaneous tissues were reapproximated using an undyed 2-0 Vicryl, the skin edges were reapproximated using a 3-0 bidirectional barbed Monocryl suture, and the skin edges sealed with Dermabond  After the skin adhesive had dried a silver impregnated Mepilex dressing was applied over the surgical incision  The patient was awakened from spinal anesthesia with sedation and transferred to PACU in stable condition       I was present for all critical portions of the procedure    Patient Disposition:  PACU     SIGNATURE: Diana Saucedo DO  DATE: February 8, 2021  TIME: 12:33 PM

## 2021-02-08 NOTE — PERIOPERATIVE NURSING NOTE
Dermatone level resolved    Regional nerve block done by anesthesia     Pain minimal  Xray completed   Preparing for transfer to [address] [place] [address]

## 2021-02-08 NOTE — PROGRESS NOTES
Progress Note - Orthopedics   Mir Lloyd 54 y o  male MRN: [id number]  Unit/Bed#: [address] [place] [address] Encounter: [id number]    Assessment:  1) POD#0 s/p DA R DEANNA    Plan:  Ancef 2 g IV x 2 for 24 hours postop  DVT prophylaxis: ASA 325mg PO BID/SCD's/Ambulation  WBAT RLE  PT/OT- WBAT RLE  Analgesia PRN  Follow up AM labs  D/c christine in AM POD #1  Dressing- monitor for drainage  Discharge planning - disposition pending progress with PT tomorrow postoperatively  Plan for discharge tomorrow pending progress with PT  Will start outpatient physical therapy later this week  Weight bearing: WBAT RLE    VTE Pharmacologic Prophylaxis: ASA 325mg PO BID  VTE Mechanical Prophylaxis: sequential compression device    Subjective:  Patient seen examined postoperatively  Pain controlled  Events of surgery discussed with the patient the patient's wife via telephone  Vitals: Blood pressure 120/78, pulse 82, temperature 97 6 °F (36 4 °C), temperature source Oral, resp  rate 18, height 5' 8 5" (1 74 m), weight 95 5 kg (210 lb 8 oz), SpO2 97 %  ,Body mass index is 31 54 kg/m²  Intake/Output Summary (Last 24 hours) at 2/8/2021 1247  Last data filed at 2/8/2021 1101  Gross per 24 hour   Intake 800 ml   Output 650 ml   Net 150 ml       Invasive Devices     Peripheral Intravenous Line            Peripheral IV 02/08/21 Left Wrist less than 1 day          Drain            Urethral Catheter Latex 16 Fr  less than 1 day                Physical Exam: NAD  Ortho Exam: RLE: Dsg c/d/i, thigh soft, +LFCN SILT, +Fem nerve motor, +DF/PF/EHL, +L3-S1 SILT, DP2+, foot warm    Lab, Imaging and other studies: PO XR R hip:  Reviewed and acceptable    Deborah MARTINEZ    Division of Adult Reconstruction  Department of Orthopaedic Surgery  [facility] [place]

## 2021-02-08 NOTE — PLAN OF CARE
Problem: PAIN - ADULT  Goal: Verbalizes/displays adequate comfort level or baseline comfort level  Description: Interventions:  - Encourage patient to monitor pain and request assistance  - Assess pain using appropriate pain scale  - Administer analgesics based on type and severity of pain and evaluate response  - Implement non-pharmacological measures as appropriate and evaluate response  - Consider cultural and social influences on pain and pain management  - Notify physician/advanced practitioner if interventions unsuccessful or patient reports new pain  Outcome: Progressing     Problem: INFECTION - ADULT  Goal: Absence or prevention of progression during hospitalization  Description: INTERVENTIONS:  - Assess and monitor for signs and symptoms of infection  - Monitor lab/diagnostic results  - Monitor all insertion sites, i e  indwelling lines, tubes, and drains  - [place] appropriate cooling/warming therapies per order  - Administer medications as ordered  - Instruct and encourage patient and family to use good hand hygiene technique  - Identify and instruct in appropriate isolation precautions for identified infection/condition  Outcome: Progressing  Goal: Absence of fever/infection during neutropenic period  Description: INTERVENTIONS:  - Monitor WBC    Outcome: Progressing     Problem: SAFETY ADULT  Goal: Patient will remain free of falls  Description: INTERVENTIONS:  - Assess patient frequently for physical needs  -  Identify cognitive and physical deficits and behaviors that affect risk of falls    -  [place] fall precautions as indicated by assessment   - Educate patient/family on patient safety including physical limitations  - Instruct patient to call for assistance with activity based on assessment  - Modify environment to reduce risk of injury  - Consider OT/PT consult to assist with strengthening/mobility  Outcome: Progressing  Goal: Maintain or return to baseline ADL function  Description: INTERVENTIONS:  -  Assess patient's ability to carry out ADLs; assess patient's baseline for ADL function and identify physical deficits which impact ability to perform ADLs (bathing, care of mouth/teeth, toileting, grooming, dressing, etc )  - Assess/evaluate cause of self-care deficits   - Assess range of motion  - Assess patient's mobility; develop plan if impaired  - Assess patient's need for assistive devices and provide as appropriate  - Encourage maximum independence but intervene and supervise when necessary  - Involve family in performance of ADLs  - Assess for home care needs following discharge   - Consider OT consult to assist with ADL evaluation and planning for discharge  - Provide patient education as appropriate  Outcome: Progressing  Goal: Maintain or return mobility status to optimal level  Description: INTERVENTIONS:  - Assess patient's baseline mobility status (ambulation, transfers, stairs, etc )    - Identify cognitive and physical deficits and behaviors that affect mobility  - Identify mobility aids required to assist with transfers and/or ambulation (gait belt, sit-to-stand, lift, walker, cane, etc )  - [place] fall precautions as indicated by assessment  - Record patient progress and toleration of activity level on Mobility SBAR; progress patient to next Phase/Stage  - Instruct patient to call for assistance with activity based on assessment  - Consider rehabilitation consult to assist with strengthening/weightbearing, etc   Outcome: Progressing

## 2021-02-08 NOTE — PHYSICAL THERAPY NOTE
PT EVALUATION       02/08/21 1450   PT Last Visit   PT Visit Date 02/08/21   Note Type   Note type Evaluation   Pain Assessment   Pain Assessment Tool 0-10   Pain Score 6   Pain Location/Orientation Orientation: Right  (lateral thigh)   Home Living   Type of [address] Two level;Stairs to enter with rails  (4 AGUSTO)   Bathroom Shower/Tub Walk-in shower   Additional Comments No DME   Prior Function   Level of [place] Independent with ADLs and functional mobility   Lives With Spouse   Receives Help From Family   ADL Assistance Independent   IADLs Independent   Vocational Full time employment   Comments +    Restrictions/Precautions   [place] Bearing Precautions Per Order Yes   RLE Weight Bearing Per Order WBAT   Other Precautions Fall Risk  (IV gettin Iron)   General   Additional Pertinent History POD #0 for right anterior DEANNA   Family/Caregiver Present No   Cognition   Overall Cognitive Status WFL   Arousal/Participation Cooperative   Orientation Level Oriented X4   Following Commands Follows all commands and directions without difficulty   RUE Assessment   RUE Assessment WFL   LUE Assessment   LUE Assessment WFL   RLE Assessment   RLE Assessment WFL  (Hip: 2+/5; knee and ankle WFLs)   LLE Assessment   LLE Assessment WFL  (strength 5/5)   Bed Mobility   Supine to Sit 4  Minimal assistance   Additional items Assist x 1;Verbal cues;LE management   Additional Comments to chair expect Min A to go sit > supine   Transfers   Sit to Stand 4  Minimal assistance   Additional items Assist x 1   Stand to Sit 4  Minimal assistance   Additional items Assist x 1;Verbal cues;Armrests   Ambulation/Elevation   Gait pattern Decreased R stance   Gait Assistance 4  Minimal assist   Assistive Device Rolling walker   Distance 3 feet    Balance   Static Sitting Fair +   Dynamic Sitting Fair   Static Standing Fair  (with RW)   Dynamic Standing Fair -  (with RW)   Ambulatory Fair -  (with RW)   Activity Tolerance Activity Tolerance Patient tolerated treatment well;Patient limited by pain   Nurse Made Aware yes: Adilene Chris  pt in chair with seat alarm activated   Assessment   Prognosis Good   Problem List Decreased strength;Decreased endurance; Impaired balance;Decreased mobility; Decreased skin integrity;Pain   Assessment Patient seen for Physical Therapy evaluation  Patient admitted with Status post hip replacement, right  Comorbidities affecting patient's physical performance include: arthritis, HCL, OA  Personal factors affecting patient at time of initial evaluation include: lives in two story house, stairs to enter home, inability to ambulate household distances, inability to navigate community distances, inability to perform ADLS and inability to perform IADLS   Prior to admission, patient was independent with functional mobility without assistive device, independent with ADLS, independent with IADLS, living with wife in a two level home with 4 steps to enter and works full time  Please find objective findings from Physical Therapy assessment regarding body systems outlined above with impairments and limitations including weakness, decreased ROM, impaired balance, decreased endurance, gait deviations, pain, decreased activity tolerance, decreased functional mobility tolerance, fall risk and decreased skin integrity  The Barthel Index was used as a functional outcome tool presenting with a score of 45 today indicating marked limitations of functional mobility and ADLS  Patient's clinical presentation is currently unstable/unpredictable as seen in patient's presentation of vital sign response, changing level of pain, increased fall risk, new onset of impairment of functional mobility, decreased endurance and new onset of weakness  Pt would benefit from continued Physical Therapy treatment to address deficits as defined above and maximize level of functional mobility   As demonstrated by objective findings, the assigned level of complexity for this evaluation is high  The patient's AM-PAC Basic Mobility Inpatient Short Form Raw Score is 18, Standardized Score is 41 05  A standardized score less than 42 9 suggests the patient may benefit from discharge to post-acute rehabilitation services; however expect pt to progress functional mobility with physical therapy and be able to return home and attend OPPT  Please also refer to the recommendation of the Physical Therapist for safe discharge planning  Goals   Patient Goals to go home   STG Expiration Date 02/15/21   Short Term Goal #1 Indep bedmobs and transfers to short sit   Short Term Goal #2 Indep transfers sit <> stand to RW ; Indep amb  with RW for 80 feet with good balance   LTG Expiration Date 02/22/21   Long Term Goal #1 Indep amb  with RW for 150 feet to allow pt to attend OPPT   Long Term Goal #2 Indep navigating 4 stairs with one railing and a cane   Plan   Treatment/Interventions ADL retraining;Functional transfer training;LE strengthening/ROM; Elevations; Therapeutic exercise; Endurance training;Patient/family training;Equipment eval/education; Bed mobility;Gait training;Spoke to nursing;Spoke to case management;OT   PT Frequency Twice a day   Recommendation   PT Discharge Recommendation Other (Comment)  (home with family and OPPT)   Equipment Recommended Walker;Cane   AM-PAC Basic Mobility Inpatient   Turning in Bed Without Bedrails 3   Lying on Back to Sitting on Edge of Flat Bed 3   Moving Bed to Chair 3   Standing Up From Chair 3   Walk in Room 3   Climb 3-5 Stairs 3   Basic Mobility Inpatient Raw Score 18   Basic Mobility Standardized Score 41 05   Barthel Index   Feeding 10   Bathing 0   Grooming Score 0   Dressing Score 5   Bladder Score 0   Bowels Score 10   Toilet Use Score 5   Transfers (Bed/Chair) Score 10   Mobility (Level Surface) Score 0   Stairs Score 5   Barthel Index Score 45   Licensure   [place] License Number  Alvarez Mohan Gómez PT  [id number]   PT TREATMENT  14:40-14:50  10 min  S: Pt c/o pain in his anterio-lateral right thigh  (RN aware and will handle pain meds)  O:Prior to transferring pt completed AAROM of RLE hip flex/ext x 8 reps  APs x 20  Pt transferred sit <> stand to RW  Instructed on use of RW to off load RLE with UEs when advancing LLE  Pt stood at [facility] and completed: wgt shifting r/l; mini marches   Pt then took several steps with RW and Min A from bed > chair  Stand > sit with use of armrests  Positioned with legs elevated, all needs in reach  RN aware  A: Pt felt much more comfortable in the chair  Did well with walking in room  P: Cont as per plan  Recommend: home with OPPT    CKF

## 2021-02-08 NOTE — ANESTHESIA PROCEDURE NOTES
Peripheral Block    Patient location during procedure: holding area  Start time: 2/8/2021 11:00 AM  Reason for block: procedure for pain, at surgeon's request and post-op pain management  Staffing  Anesthesiologist: Sylvia Almendarez MD  Preanesthetic Checklist  Completed: patient identified, site marked, surgical consent, pre-op evaluation, timeout performed, IV checked, risks and benefits discussed and monitors and equipment checked  Peripheral Block  Patient position: supine  Prep: ChloraPrep  Patient monitoring: heart rate, cardiac monitor, continuous pulse ox and frequent blood pressure checks  Block type: fascia iliaca  Laterality: right  Injection technique: single-shot  Procedures: ultrasound guided, Ultrasound guidance required for the procedure to increase accuracy and safety of medication placement and decrease risk of complications  Ultrasound permanent image savedbupivacaine (MARCAINE) 0 5 % perineural infiltration, 5 mL (with 20 ml of exparel)  Needle  Needle type: Stimuplex   Needle gauge: 22 G  Needle length: 10 cm  Needle localization: ultrasound guidance  Assessment  Injection assessment: negative aspiration for heme, incremental injection and no paresthesia on injection  Paresthesia pain: none  Heart rate change: no  Slow fractionated injection: yes  Post-procedure:  site cleaned  patient tolerated the procedure well with no immediate complications  Additional Notes  Nolvia Abbott present for time out and procedure

## 2021-02-08 NOTE — ANESTHESIA PROCEDURE NOTES
Spinal Block    Patient location during procedure: OR  Start time: 2/8/2021 7:32 AM  Reason for block: at surgeon's request and primary anesthetic  Staffing  Anesthesiologist: Allyson Olivares MD  Resident/CRNA: Naif Smyth CRNA  Performed: resident/CRNA   Preanesthetic Checklist  Completed: patient identified, site marked, surgical consent, pre-op evaluation, timeout performed, IV checked, risks and benefits discussed and monitors and equipment checked  Spinal Block  Patient position: sitting  Prep: Betadine  Patient monitoring: heart rate, cardiac monitor, continuous pulse ox and frequent blood pressure checks  Approach: midline  Location: L3-4  Injection technique: single-shot  Needle  Needle type: pencil-tip   Needle gauge: 27 G  Needle length: 10 cm  Assessment  Sensory level: T4  Injection Assessment:  negative aspiration for heme, no paresthesia on injection and positive aspiration for clear CSF

## 2021-02-08 NOTE — INTERVAL H&P NOTE
H&P reviewed  After examining the patient I find no changes in the patients condition since the H&P had been written  Patient has been seen and cleared by his PCP status post COVID infection a few months ago  He denies any current fever, chills, nausea, vomiting, headache, chest pain, trouble breathing  He will be a good candidate for aspirin postoperatively for DVT prophylaxis  He will be a good candidate for outpatient physical therapy following his discharge from the hospital   All of his questions were addressed today  Proceed to OR for direct anterior right total hip arthroplasty      Vitals:    02/08/21 0601   BP: 148/84   Pulse: 90   Resp: 18   Temp: 98 1 °F (36 7 °C)   SpO2: 98%

## 2021-02-09 VITALS
WEIGHT: 210.5 LBS | HEART RATE: 97 BPM | DIASTOLIC BLOOD PRESSURE: 77 MMHG | OXYGEN SATURATION: 96 % | TEMPERATURE: 98.3 F | BODY MASS INDEX: 31.18 KG/M2 | HEIGHT: 69 IN | SYSTOLIC BLOOD PRESSURE: 130 MMHG | RESPIRATION RATE: 18 BRPM

## 2021-02-09 LAB
ANION GAP SERPL CALCULATED.3IONS-SCNC: 9 MMOL/L (ref 4–13)
BUN SERPL-MCNC: 17 MG/DL (ref 5–25)
CALCIUM SERPL-MCNC: 8 MG/DL (ref 8.3–10.1)
CHLORIDE SERPL-SCNC: 103 MMOL/L (ref 100–108)
CO2 SERPL-SCNC: 26 MMOL/L (ref 21–32)
CREAT SERPL-MCNC: 0.78 MG/DL (ref 0.6–1.3)
ERYTHROCYTE [DISTWIDTH] IN BLOOD BY AUTOMATED COUNT: 12.4 % (ref 11.6–15.1)
GFR SERPL CREATININE-BSD FRML MDRD: 102 ML/MIN/1.73SQ M
GLUCOSE P FAST SERPL-MCNC: 126 MG/DL (ref 65–99)
GLUCOSE SERPL-MCNC: 126 MG/DL (ref 65–140)
HCT VFR BLD AUTO: 36.9 % (ref 36.5–49.3)
HGB BLD-MCNC: 12.1 G/DL (ref 12–17)
MCH RBC QN AUTO: 30.2 PG (ref 26.8–34.3)
MCHC RBC AUTO-ENTMCNC: 32.8 G/DL (ref 31.4–37.4)
MCV RBC AUTO: 92 FL (ref 82–98)
PLATELET # BLD AUTO: 149 THOUSANDS/UL (ref 149–390)
PMV BLD AUTO: 10.3 FL (ref 8.9–12.7)
POTASSIUM SERPL-SCNC: 3.7 MMOL/L (ref 3.5–5.3)
RBC # BLD AUTO: 4.01 MILLION/UL (ref 3.88–5.62)
SODIUM SERPL-SCNC: 138 MMOL/L (ref 136–145)
WBC # BLD AUTO: 9.87 THOUSAND/UL (ref 4.31–10.16)

## 2021-02-09 PROCEDURE — 97110 THERAPEUTIC EXERCISES: CPT

## 2021-02-09 PROCEDURE — 97167 OT EVAL HIGH COMPLEX 60 MIN: CPT

## 2021-02-09 PROCEDURE — 80048 BASIC METABOLIC PNL TOTAL CA: CPT | Performed by: PHYSICIAN ASSISTANT

## 2021-02-09 PROCEDURE — 85027 COMPLETE CBC AUTOMATED: CPT | Performed by: PHYSICIAN ASSISTANT

## 2021-02-09 PROCEDURE — 97535 SELF CARE MNGMENT TRAINING: CPT

## 2021-02-09 PROCEDURE — 99024 POSTOP FOLLOW-UP VISIT: CPT | Performed by: ORTHOPAEDIC SURGERY

## 2021-02-09 PROCEDURE — 97530 THERAPEUTIC ACTIVITIES: CPT

## 2021-02-09 RX ORDER — CELECOXIB 100 MG/1
100 CAPSULE ORAL 2 TIMES DAILY
Qty: 28 CAPSULE | Refills: 0 | Status: SHIPPED | OUTPATIENT
Start: 2021-02-09 | End: 2021-02-24 | Stop reason: ALTCHOICE

## 2021-02-09 RX ORDER — ASPIRIN 325 MG
325 TABLET ORAL 2 TIMES DAILY
Qty: 84 TABLET | Refills: 0 | Status: SHIPPED | OUTPATIENT
Start: 2021-02-09 | End: 2021-03-24 | Stop reason: ALTCHOICE

## 2021-02-09 RX ORDER — OXYCODONE HYDROCHLORIDE 5 MG/1
TABLET ORAL
Qty: 60 TABLET | Refills: 0 | Status: SHIPPED | OUTPATIENT
Start: 2021-02-09 | End: 2021-03-24 | Stop reason: ALTCHOICE

## 2021-02-09 RX ORDER — ACETAMINOPHEN 325 MG/1
975 TABLET ORAL EVERY 8 HOURS
Refills: 0
Start: 2021-02-09 | End: 2021-03-24 | Stop reason: ALTCHOICE

## 2021-02-09 RX ORDER — DOCUSATE SODIUM 100 MG/1
100 CAPSULE, LIQUID FILLED ORAL 2 TIMES DAILY
Qty: 60 CAPSULE | Refills: 0 | Status: SHIPPED | OUTPATIENT
Start: 2021-02-09 | End: 2021-03-24 | Stop reason: ALTCHOICE

## 2021-02-09 RX ADMIN — SODIUM CHLORIDE 75 ML/HR: 0.9 INJECTION, SOLUTION INTRAVENOUS at 06:42

## 2021-02-09 RX ADMIN — GABAPENTIN 200 MG: 100 CAPSULE ORAL at 09:44

## 2021-02-09 RX ADMIN — PANTOPRAZOLE SODIUM 40 MG: 40 TABLET, DELAYED RELEASE ORAL at 09:44

## 2021-02-09 RX ADMIN — ACETAMINOPHEN 975 MG: 325 TABLET, FILM COATED ORAL at 04:37

## 2021-02-09 RX ADMIN — DEXAMETHASONE SODIUM PHOSPHATE 10 MG: 4 INJECTION, SOLUTION INTRAMUSCULAR; INTRAVENOUS at 09:44

## 2021-02-09 RX ADMIN — CELECOXIB 100 MG: 100 CAPSULE ORAL at 09:44

## 2021-02-09 RX ADMIN — DOCUSATE SODIUM 100 MG: 100 CAPSULE, LIQUID FILLED ORAL at 09:44

## 2021-02-09 RX ADMIN — ACETAMINOPHEN 975 MG: 325 TABLET, FILM COATED ORAL at 11:56

## 2021-02-09 RX ADMIN — OXYCODONE HYDROCHLORIDE 10 MG: 10 TABLET ORAL at 01:32

## 2021-02-09 RX ADMIN — IRON SUCROSE 300 MG: 20 INJECTION, SOLUTION INTRAVENOUS at 09:43

## 2021-02-09 RX ADMIN — ASPIRIN 325 MG: 325 TABLET, FILM COATED ORAL at 09:44

## 2021-02-09 NOTE — PROGRESS NOTES
Progress Note - Orthopedics   Larry Perez 54 y o  male MRN: [id number]  Unit/Bed#: [address] [place] [address] Encounter: [id number]    Assessment:  1) POD#1 s/p Direct Anterior Right DEANNA    Plan:  Ancef 2 g IV x 2 for 24 hours postop - completed  DVT prophylaxis: ASA 325mg PO BID/SCD's/Ambulation  PT/OT- WBAT RLE  Analgesia PRN  Follow up AM labs - H/H/BUN/Cr stable, no evidence hematoma, 2nd dose venofer this morning, may DC IVF  [place] DC - monitor for void  Dressing- monitor for drainage, Mepilex may remain in place for 7 days  Discharge planning - disposition pending progress with PT  Plan for discharge today to start outpatient physical therapy later this week  Weight bearing: WBAT RLE    VTE Pharmacologic Prophylaxis: ASA 325mg PO BID  VTE Mechanical Prophylaxis: sequential compression device    Subjective:  Patient seen and examined chair this morning  Pain controlled  No overnight events  Receiving second venofer dose currently  Denies CP, SOB, dizziness, lightheadedness, or parasthesias    Vitals: Blood pressure 117/68, pulse 70, temperature 97 8 °F (36 6 °C), resp  rate 20, height 5' 8 5" (1 74 m), weight 95 5 kg (210 lb 8 oz), SpO2 98 %  ,Body mass index is 31 54 kg/m²        Intake/Output Summary (Last 24 hours) at 2/9/2021 7243  Last data filed at 2/9/2021 0600  Gross per 24 hour   Intake 2890 ml   Output 5200 ml   Net -2310 ml       Invasive Devices     Peripheral Intravenous Line            Peripheral IV 02/08/21 Left Wrist 1 day                Physical Exam: NAD, A&Ox3, resting comfortably in bed  Ortho Exam: RLE: right anterior hip dsg c/d/i, thigh soft, +LFCN SILT, +Fem nerve motor, +DF/PF/EHL, +L3-S1 SILT, DP2+, foot warm, SCDs in place    Lab, Imaging and other studies: PO XR R hip:  Reviewed and acceptable    Lab Results   Component Value Date    WBC 9 87 02/09/2021    HGB 12 1 02/09/2021    HCT 36 9 02/09/2021    MCV 92 02/09/2021     02/09/2021     Lab Results   Component Value Date    SODIUM 138 02/09/2021    K 3 7 02/09/2021     02/09/2021    CO2 26 02/09/2021    AGAP 9 02/09/2021    BUN 17 02/09/2021    CREATININE 0 78 02/09/2021    GLUC 126 02/09/2021    GLUF 126 (H) 02/09/2021    CALCIUM 8 0 (L) 02/09/2021    AST 27 12/04/2020    ALT 37 12/04/2020    TP 7 4 12/04/2020    TBILI 0 5 12/04/2020    EGFR 102 02/09/2021     Sadiq Gastelum PA-C

## 2021-02-09 NOTE — PROGRESS NOTES
02/09/21 1441   Patient Information   Mental Status Alert   Primary Caregiver Self   Accompanied by/Relationship The Pt was admitted outpatient S/P hip replacement  Pt in need of a R[facility]W and [facility]/W will be provided to the Pt upon D/C home  No further CM needs identified     Support System Immediate family   Activities of Daily Living Prior to Admission   Functional Status [address] of Transport to Appts: Drive Self

## 2021-02-09 NOTE — PHYSICAL THERAPY NOTE
PT TREATMENT     02/09/21 1430   PT Last Visit   PT Visit Date 02/09/21   Note Type   Note Type Treatment   Pain Assessment   Pain Assessment Tool 0-10   Pain Score 4   Pain Location/Orientation Orientation: Right  (thigh)   Restrictions/Precautions   RLE Weight Bearing Per Order WBAT   Other Precautions Fall Risk   General   Chart Reviewed Yes   Family/Caregiver Present No   Cognition   Overall Cognitive Status WFL   Arousal/Participation Cooperative   Attention Within functional limits   Orientation Level Oriented X4   Following Commands Follows all commands and directions without difficulty   Subjective   Subjective "I walked to the bathroom" " I feel much better"   Transfers   Sit to Stand 7  Independent   Stand to Sit 7  Independent   Stand pivot 7  Independent   Toilet transfer 7  Independent  (standing at toilet to urinate)   Additional Comments using RW for above   Ambulation/Elevation   Gait pattern Decreased R stance  (short stride with right foot, decreased knee flexion)   Gait Assistance 7  Independent  (with verbal cues for gait pattern )   Assistive Device Rolling walker   Distance 150 feet x 2    Stair Management Assistance 7  Independent   Stair Management Technique One rail R;With cane; Step to pattern   Number of Stairs 4   Balance   Ambulatory Fair +   Activity Tolerance   Activity Tolerance Patient tolerated treatment well   Nurse Made Aware yes: walker to room , ready for d/c   Assessment   Prognosis Good   Problem List Decreased strength;Decreased endurance; Impaired balance;Decreased skin integrity   Assessment Tolerated much better this afternoon  The patient's AM-PAC Basic Mobility Inpatient Short Form Raw Score is 23, Standardized Score is 50  88  A standardized score greater than 42 9 suggests the patient may benefit from discharge to home with OPPT  Please also refer to the recommendation of the Physical Therapist for safe discharge planning     Goals   Patient Goals to go home today Plan   Treatment/Interventions ADL retraining;Functional transfer training;LE strengthening/ROM; Elevations; Therapeutic exercise; Endurance training;Patient/family training;Equipment eval/education;Gait training;Spoke to nursing;Spoke to MD;Spoke to case management   Progress Progressing toward goals   PT Frequency Twice a day   Recommendation   PT Discharge Recommendation Other (Comment)  (home with OPPT)   Equipment Recommended Walker;Cane   Additional Comments cane issued, new walker adjusted to proper height   AM-PAC Basic Mobility Inpatient   Turning in Bed Without Bedrails 4   Lying on Back to Sitting on Edge of Flat Bed 3   Moving Bed to Chair 4   Standing Up From Chair 4   Walk in Room 4   Climb 3-5 Stairs 4   Basic Mobility Inpatient Raw Score 23   Basic Mobility Standardized Score 35 99   Licensure   [place] License Number  [place] Grecia Philippe Led PT [id number]   Pt also seen for quad sets, AAROM for hip/knee flexion, APs,  AAROM: hip ab/adduction    Written instructions issued

## 2021-02-09 NOTE — OCCUPATIONAL THERAPY NOTE
Occupational Therapy Evaluation/Treatment       02/09/21 1125   Note Type   Note type Evaluation   Restrictions/Precautions   RLE Weight Bearing Per Order WBAT   Other Precautions Fall Risk   Pain Assessment   Pain Assessment Tool 0-10   Pain Score 5   Pain Location/Orientation Orientation: Right;Location: Hip;Location: Leg   Home Living   Type of Home House   Home Layout Two level  (4 AGUSTO)   Bathroom Shower/Tub Walk-in shower   Home Equipment   (none)   Prior Function   Level of [place] Independent with ADLs and functional mobility   Lives With Spouse   Receives Help From Family   ADL Assistance Independent   IADLs Independent   Vocational Full time employment   Lifestyle   Intrinsic Gratification working   ADL   Eating Assistance 7  [address] [facility] [address]  [address]  5  Supervision/Setup   Transfers   Sit to [address] [place] [address] 5  Supervision   Additional Comments 15 feet   Additional items Rolling walker   Balance   Static Sitting Fair +   Dynamic Sitting Fair   Static Standing Fair   Dynamic Standing Fair   Activity Tolerance   Activity Tolerance Patient limited by pain   Nurse Made Aware yes   RUE Assessment   RUE Assessment WNL   LUE Assessment   LUE Assessment WNL   Cognition   Overall Cognitive Status WFL   Arousal/Participation Cooperative   Attention Within functional limits   Orientation Level Oriented X4   Following Commands Follows all commands and directions without difficulty   Assessment   Limitation Decreased ADL status; Decreased UE strength;Decreased Safe judgement during ADL;Decreased endurance;Decreased high-level ADLs; Decreased self-care trans  (decreased balance and mobility )   Prognosis Good Assessment Patient evaluated by Occupational Therapy  Patient admitted with Status post hip replacement, right  The patients occupational profile, medical and therapy history includes a extensive additional review of physical, cognitive, or psychosocial history related to current functional performance  Comorbidities affecting functional mobility and ADLS include: arthritis  Prior to admission, patient was independent with functional mobility without assistive device, independent with ADLS, independent with IADLS and works full time  The evaluation identifies the following performance deficits: weakness, impaired balance, decreased endurance, increased fall risk, new onset of impairment of functional mobility, decreased ADLS, decreased IADLS, pain, decreased activity tolerance, decreased safety awareness, impaired judgement and decreased strength, that result in activity limitations and/or participation restrictions  This evaluation requires clinical decision making of high complexity, because the patient presents with comorbidites that affect occupational performance and required significant modification of tasks or assistance with consideration of multiple treatment options  The Barthel Index was used as a functional outcome tool presenting with a score of 55, indicating marked limitations of functional mobility and ADLS  The patient's raw score on the -PAC Daily Activity inpatient short form is 21, standardized score is 44 27, greater than 39 4  Patients at this level are likely to benefit from DC to home  Please refer to the recommendation of the Occupational Therapist for safe DC planning  Patient will benefit from skilled Occupational Therapy services to address above deficits and facilitate a safe return to prior level of function     Goals   Patient Goals go home, less pain    STG Time Frame   (1-7 days)   Short Term Goal  Goals established to promote patient goal of going home:  Patient will increase standing tolerance to 3 minutes during ADL task to decrease assistance level and decrease fall risk; Patient will increase bed mobility to independent in preparation for ADLS and transfers; Patient will increase functional mobility to and from bathroom with rolling walker independently to increase performance with ADLS and to use a toilet; Patient will improve functional activity tolerance to 10 minutes of sustained functional tasks to increase participation in basic self-care and decrease assistance level;  Patient will increase dynamic standing balance to fair+ to improve postural stability and decrease fall risk during standing ADLS and transfers  LTG Time Frame   (8-14 days)   Long Term Goal Goals established to promote patient goal of going home:  Patient will increase standing tolerance to 6 minutes during ADL task to decrease assistance level and decrease fall risk;  Patient will improve functional activity tolerance to 20 minutes of sustained functional tasks to increase participation in basic self-care and decrease assistance level;  Patient will increase dynamic standing balance to good to improve postural stability and decrease fall risk during standing ADLS and transfers  Functional Transfer Goals   Pt Will Perform All Functional Transfers   (STG independent )   ADL Goals   Pt Will Perform Grooming Standing at sink  (STG independent )   Pt Will Perform Bathing   (STG supervision LTG independent )   Pt Will Perform LE Dressing   (STG supervision LTG independent )   Pt Will Perform Toileting   (STG independent )   Plan   Treatment Interventions ADL retraining;Functional transfer training;UE strengthening/ROM; Endurance training;Patient/family training;Equipment evaluation/education; Activityengagement; Compensatory technique education   Goal Expiration Date 02/23/21   OT Frequency 5x/wk   Additional Treatment Session   Start Time 1115   End Time 1125   Treatment Assessment Patient completed donning underwear with min assist, pants with supervision with use of reacher, R sock doffing/donning with reacher and sock aid supervision  Patient donned shirt independently  Patient donned shoes with supervision  Sit to stand supervision  Toilet transfer standard toilet with 2 grab bars supervision with difficulty, patient will benefit from a commode for home, case management aware  Patient stood to wash hands at sink with supervision  Functional mobility 15 feet with RW supervision  Limited by pain but tolerated well  Patients wife will assist with ADLS at home, pt aware how to obtain dressing equipment as needed for home  Patient instructed on car transfers, verbalized understanding       Recommendation   OT Discharge Recommendation Return to previous environment with social support   AM-PAC Daily Activity Inpatient   Lower Body Dressing 3   Bathing 3   Toileting 3   Upper Body Dressing 4   Grooming 4   Eating 4   Daily Activity Raw Score 21   Daily Activity Standardized Score (Calc for Raw Score >=11) 44 27   AM-PAC Applied Cognition Inpatient   Following a Speech/Presentation 4   Understanding Ordinary Conversation 4   Taking Medications 4   Remembering Where Things Are Placed or Put Away 4   Remembering List of 4-5 Errands 4   Taking Care of Complicated Tasks 4   Applied Cognition Raw Score 24   Applied Cognition Standardized Score 62 21   Barthel Index   Feeding 10   Bathing 0   Grooming Score 0   Dressing Score 5   Bladder Score 10   Bowels Score 10   Toilet Use Score 5   Transfers (Bed/Chair) Score 10   Mobility (Level Surface) Score 0   Stairs Score 5   Barthel Index Score 54   Licensure   [place] License Number  [place] Mederos Chris 87 OTR/L [id number]

## 2021-02-09 NOTE — PLAN OF CARE
Problem: PAIN - ADULT  Goal: Verbalizes/displays adequate comfort level or baseline comfort level  Description: Interventions:  - Encourage patient to monitor pain and request assistance  - Assess pain using appropriate pain scale  - Administer analgesics based on type and severity of pain and evaluate response  - Implement non-pharmacological measures as appropriate and evaluate response  - Consider cultural and social influences on pain and pain management  - Notify physician/advanced practitioner if interventions unsuccessful or patient reports new pain  Outcome: Progressing     Problem: INFECTION - ADULT  Goal: Absence or prevention of progression during hospitalization  Description: INTERVENTIONS:  - Assess and monitor for signs and symptoms of infection  - Monitor lab/diagnostic results  - Monitor all insertion sites, i e  indwelling lines, tubes, and drains  - Randolph appropriate cooling/warming therapies per order  - Administer medications as ordered  - Instruct and encourage patient and family to use good hand hygiene technique  - Identify and instruct in appropriate isolation precautions for identified infection/condition  Outcome: Progressing  Goal: Absence of fever/infection during neutropenic period  Description: INTERVENTIONS:  - Monitor WBC    Outcome: Progressing     Problem: SAFETY ADULT  Goal: Patient will remain free of falls  Description: INTERVENTIONS:  - Assess patient frequently for physical needs  -  Identify cognitive and physical deficits and behaviors that affect risk of falls    -  Randolph fall precautions as indicated by assessment   - Educate patient/family on patient safety including physical limitations  - Instruct patient to call for assistance with activity based on assessment  - Modify environment to reduce risk of injury  - Consider OT/PT consult to assist with strengthening/mobility  Outcome: Progressing  Goal: Maintain or return to baseline ADL function  Description: INTERVENTIONS:  -  Assess patient's ability to carry out ADLs; assess patient's baseline for ADL function and identify physical deficits which impact ability to perform ADLs (bathing, care of mouth/teeth, toileting, grooming, dressing, etc )  - Assess/evaluate cause of self-care deficits   - Assess range of motion  - Assess patient's mobility; develop plan if impaired  - Assess patient's need for assistive devices and provide as appropriate  - Encourage maximum independence but intervene and supervise when necessary  - Involve family in performance of ADLs  - Assess for home care needs following discharge   - Consider OT consult to assist with ADL evaluation and planning for discharge  - Provide patient education as appropriate  Outcome: Progressing  Goal: Maintain or return mobility status to optimal level  Description: INTERVENTIONS:  - Assess patient's baseline mobility status (ambulation, transfers, stairs, etc )    - Identify cognitive and physical deficits and behaviors that affect mobility  - Identify mobility aids required to assist with transfers and/or ambulation (gait belt, sit-to-stand, lift, walker, cane, etc )  - [place] fall precautions as indicated by assessment  - Record patient progress and toleration of activity level on Mobility SBAR; progress patient to next Phase/Stage  - Instruct patient to call for assistance with activity based on assessment  - Consider rehabilitation consult to assist with strengthening/weightbearing, etc   Outcome: Progressing

## 2021-02-09 NOTE — UTILIZATION REVIEW
Initial Clinical Review    Elective outpatient surgical procedure  Age/Sex: 54 y o  male  Surgery Date: 2/8/21  Procedure: ARTHROPLASTY HIP TOTAL ANTERIOR (Right)  Anesthesia: Spinal with sedation, postoperative fascia iliaca block with Exparel  Operative Findings: Intraoperatively there was evidence of hip dysplasia with this shallow vertically oriented acetabulum and mildly dysplastic femoral neck that was in valgus  There was evidence of grade 4 changes on both the femoral head and acetabulum  Ultimately a uncemented acetabular component was successfully implanted in approximately 45° of inclination 25° of anteversion after being appropriately medialized to optimize coverage in the setting of his dysplasia  The final acetabular component had excellent purchase  The polyethylene liner was locked into place  The femoral component was implanted approximately 5° of anteversion  The at its final station had excellent rotational and axial stability  Final construct demonstrated excellent stability with 50° of internal rotation of the hip, 105° of external rotation of the hip, and 50° of external rotation with the leg lowered into extension  On leg length evaluation the leg lengths appeared to be equalized  POD#1 Progress Note:   1) POD#1 s/p Direct Anterior Right DEANNA     Plan:  Ancef 2 g IV x 2 for 24 hours postop - completed  DVT prophylaxis: ASA 325mg PO BID/SCD's/Ambulation  PT/OT- WBAT RLE  Analgesia PRN  Follow up AM labs - H/H/BUN/Cr stable, no evidence hematoma, 2nd dose venofer this morning, may DC IVF  [place] DC - monitor for void  Dressing- monitor for drainage, Mepilex may remain in place for 7 days  Discharge planning - disposition pending progress with PT  Plan for discharge today to start outpatient physical therapy later this week    Admission Orders: Date/Time/Statement:     Vital Signs: /66 (BP Location: Right arm)   Pulse 82   Temp 98 1 °F (36 7 °C) (Oral)   Resp 18   Ht 5' 8 5" (1 74 m)   Wt 95 5 kg (210 lb 8 oz)   SpO2 97%   BMI 31 54 kg/m²   Diet: regular  Mobility: as tolerated, RLE WBAT  DVT Prophylaxis: scd  Medications/Pain Control:   Scheduled Medications:  acetaminophen, 975 mg, Oral, Q8H  aspirin, 325 mg, Oral, BID  atorvastatin, 20 mg, Oral, Daily With Dinner  celecoxib, 100 mg, Oral, BID  docusate sodium, 100 mg, Oral, BID  gabapentin, 200 mg, Oral, BID  pantoprazole, 40 mg, Oral, Daily  scopolamine, 1 patch, Transdermal, Once      Continuous IV Infusions:     PRN Meds:  aluminum-magnesium hydroxide-simethicone, 30 mL, Oral, Q6H PRN  bisacodyl, 10 mg, Rectal, Daily PRN  HYDROmorphone, 0 5 mg, Intravenous, Q2H PRN  ondansetron, 4 mg, Intravenous, Q6H PRN  oxyCODONE, 10 mg, Oral, Q4H PRN  oxyCODONE, 5 mg, Oral, Q4H PRN        Network Utilization Review Department  ATTENTION: Please call with any questions or concerns to [phone] and carefully listen to the prompts so that you are directed to the right person  All voicemails are confidential   Eladio Los all requests for admission clinical reviews, approved or denied determinations and any other requests to dedicated fax number below belonging to the campus where the patient is receiving treatment   List of dedicated fax numbers for the Facilities:  [address] DENIALS (Administrative/Medical Necessity) [phone]   [address] (Maternity/NICU/Pediatrics) [phone]   [address] [phone]   [address] [place] [id number] [facility] Johnathon Ortega [address]) [address] [address] [place] [address] [phone]

## 2021-02-09 NOTE — DISCHARGE INSTRUCTIONS
Direct Anterior Total Hip Replacement     WHAT YOU SHOULD KNOW:   Minimally invasive total hip replacement is surgery to replace a damaged hip joint with an implant  AFTER YOU LEAVE:     Medicines:   · Anticoagulants    are a type of blood thinner medicine that helps prevent clots  Clots can cause strokes, heart attacks, and death  These medicines may cause you to bleed or bruise more easily  You will be on full-dose aspirin twice a day for 6 weeks  ¨ Watch for bleeding from your gums or nose  Watch for blood in your urine and bowel movements  Use a soft washcloth and a soft toothbrush  If you shave, use an electric razor  Avoid activities that can cause bruising or bleeding  ¨ Tell your healthcare provider about all medicines you take because many medicines cannot be used with anticoagulants  Do not start or stop any medicines unless your healthcare provider tells you to  Tell your dentist and other healthcare providers that you take anticoagulants  Wear a bracelet or necklace that says you take this medicine  · NSAIDs  help decrease swelling, pain, and fever  This medicine is available with or without a doctor's order  NSAIDs can cause stomach bleeding or kidney problems in certain people  If you take blood thinner medicine, always ask your orthopedist if NSAIDs are safe for you  Always read the medicine label and follow directions  You will be given Celebrex 100 mg to take twice a day for the first 2 weeks postoperatively  · Prescription pain medicine  may be given  Ask your orthopedist how to take this medicine safely  · Stool softeners  make it easier for you to have a bowel movement  You may need this medicine to treat or prevent constipation  You will be given Colace 100mg to take twice a day    · Take your medicine as directed  Contact your orthopedist if you think your medicine is not helping or if you have side effects  Tell him if you are allergic to any medicine   Keep a list of the medicines, vitamins, and herbs you take  Include the amounts, and when and why you take them  Bring the list or the pill bottles to follow-up visits  Carry your medicine list with you in case of an emergency  Follow up with your orthopedist as directed: You may need to return to have your wound checked  Write down your questions so you remember to ask them during your visits  Please schedule an appointment to see Dr Arturo Baum 2 weeks after surgery  Physical therapy:  A physical therapist teaches you exercises to help improve movement and strength, and to decrease pain  You will begin outpatient physical therapy later this week as planned  Wound Care:  Please leave the Mepilex dressing in place for 7 days after surgery  After 7 days, you may remove the dressing and leave the incision open to air  Do not get the dressing or the incision wet until your seen in the office  If the incision is uncomfortable under clothing after the Mepilex is removed, you may cover it with a a dry sterile dressing, but should remain dry at all times  Self-care:   · Use a cane, walker, or crutches as directed  These devices will help decrease your risk of falling  · Wear pressure stockings  These are long, tight stockings that put pressure on your legs to promote blood flow and prevent clots and decrease swelling  · Prevent dislocation of your hip implant:      ¨ Avoid extremes of external rotation of the leg    Contact your orthopedist if:  · You have a fever over 101 0°F     · You have trouble moving or bending your joint  · You have increased pain and swelling in your joint, even after you take pain medicine  · You have back pain or lower leg pain when you bend your foot upwards  · You have questions or concerns about your condition or care  Seek immediate care or call 911 if:   · You feel like you are going to faint  · Blood soaks through/saturates your bandage      · Your incision comes apart  · Your incision is red, swollen, or draining pus  · You cannot walk or move your joint, or the limb is numb  · Your arm or leg feels warm, tender, and painful  It may look swollen and red  · You have a seizure or feel confused  · You feel lightheaded, short of breath, and have chest pain  · You have chest pain when you take a deep breath or cough  You may cough up blood  © 2014 [address] is for End User's use only and may not be sold, redistributed or otherwise used for commercial purposes  All illustrations and images included in CareNotes® are the copyrighted property of A [employer] M [employer]  or [employer]  The above information is an  only  It is not intended as medical advice for individual conditions or treatments  Talk to your doctor, nurse or pharmacist before following any medical regimen to see if it is safe and effective for you  Oxycodone, Rapid Release (By mouth)   Oxycodone Hydrochloride (ff-d-TJN-done geetha-droe-KLOR-deepali)  Treats moderate to severe pain  This medicine is a narcotic pain reliever  Brand Name(s): Oxaydo, Oxy IR, Roxicodone   There may be other brand names for this medicine  When This Medicine Should Not Be Used: This medicine is not right for everyone  Do not use it if you had an allergic reaction to oxycodone, codeine, hydrocodone, dihydrocodeine, or morphine, or if you have severe lung or breathing problems, or stomach or bowel blockage (including paralytic ileus)  How to Use This Medicine:   Capsule, Liquid, Tablet  · Take your medicine as directed  Your dose may need to be changed several times to find what works best for you  · An overdose can be dangerous  Follow directions carefully so you do not get too much medicine at one time  · Oral liquid: Measure the oral liquid medicine with a marked measuring spoon, oral syringe, or medicine cup    · Oxaydo® tablet: Swallow it whole with enough water to swallow it completely  Do not break, crush, chew, or dissolve it  Do not wet the tablet before you put it in your mouth  · While taking the Roxybond tablet, part of it may pass into your stool  This is normal and nothing to worry about  · This medicine should come with a Medication Guide  Ask your pharmacist for a copy if you do not have one  · Missed dose: Take a dose as soon as you remember  If it is almost time for your next dose, wait until then and take a regular dose  Do not take extra medicine to make up for a missed dose  If you miss a dose of Roxybond or Roxicodone®, skip the missed dose and go back to your regular dosing schedule  · Store the medicine in a closed container at room temperature, away from heat, moisture, and direct light  Store the medicine in a secure place to prevent others from getting it  Drop off any unused narcotic medicine at a drug take-back location right away  If you do not have a drug take-back location near you, flush any unused narcotic medicine down the toilet  Check your local drug store and clinics for take-back locations  You can also check the [employer] web site for locations  Here is the link to the FDA safe disposal of medicines website: www fda gov/drugs/resourcesforyou/consumers/buyingusingmedicinesafely/ensuringsafeuseofmedicine/safedisposalofmedicines/mbj292652 htm  Drugs and Foods to Avoid:   Ask your doctor or pharmacist before using any other medicine, including over-the-counter medicines, vitamins, and herbal products  · Do not use this medicine if you are using or have used an MAO inhibitor within the past 14 days  · Some medicines can affect how oxycodone works  Tell your doctor if you are using any of the following:  ? Amiodarone, carbamazepine, cyclobenzaprine, erythromycin, ketoconazole, metaxalone, mirtazapine, phenytoin, quinidine, rifampin, ritonavir, tramadol, trazodone  ? Diuretic (water pill)  ?  Medicine to treat depression, anxiety, or mental health  ? Medicine to treat migraine headaches  ? Phenothiazine medicine  · Tell your doctor if you use anything else that makes you sleepy  Some examples are allergy medicine, narcotic pain medicine, and alcohol  Tell your doctor if you are also using buprenorphine, butorphanol, nalbuphine, pentazocine, or a muscle relaxer  · Do not drink alcohol while you are using this medicine  Warnings While Using This Medicine:   · Tell your doctor if you are pregnant or breastfeeding, or if you have kidney disease, liver disease, heart disease, low blood pressure, lung disease or breathing problems (including asthma, COPD, sleep apnea), scoliosis, an enlarged prostate, trouble urinating or swallowing, thyroid problems, McLeod disease, gallbladder or pancreas problems, or digestion problems  Tell your doctor if you have a history of head injury, brain tumor, mental health problems, seizures, or alcohol or drug addiction  · This medicine may cause the following problems:  ? Increased risk of overdose, which can lead to death  ? Respiratory depression (serious breathing problem that can be life-threatening)  ? Sleep-related breathing problems (including sleep apnea, sleep-related hypoxemia)  ? Serotonin syndrome, when used with certain medicines  · This medicine may make you dizzy, drowsy, or lightheaded  Do not drive or do anything else that could be dangerous until you know how this medicine affects you  Sit or lie down if you feel dizzy  Stand up carefully  · This medicine can be habit-forming  Do not use more than your prescribed dose  Call your doctor if you think your medicine is not working  · Do not stop using this medicine suddenly  Your doctor will need to slowly decrease your dose before you stop it completely  · This medicine may cause constipation, especially with long-term use  Ask your doctor if you should use a laxative to prevent and treat constipation   Drink plenty of liquids to help avoid constipation  · This medicine could cause infertility  Talk with your doctor before using this medicine if you plan to have children  · Keep all medicine out of the reach of children  Never share your medicine with anyone  Possible Side Effects While Using This Medicine:   Call your doctor right away if you notice any of these side effects:  · Allergic reaction: Itching or hives, swelling in your face or hands, swelling or tingling in your mouth or throat, chest tightness, trouble breathing  · Anxiety, restlessness, fast heartbeat, fever, sweating, muscle spasms, twitching, nausea, vomiting, diarrhea, seeing or hearing things that are not there  · Blue lips, fingernails, or skin, trouble breathing  · Extreme dizziness or weakness, shallow breathing, slow heartbeat, sweating, cold or clammy skin, seizures  · Lightheadedness, dizziness, fainting  · Severe constipation, stomach pain  If you notice these less serious side effects, talk with your doctor:   · Mild constipation  · Sleepiness, tiredness  If you notice other side effects that you think are caused by this medicine, tell your doctor  Call your doctor for medical advice about side effects  You may report side effects to FDA at 9-647-FDA-6665  © Copyright [address] [place] [address] Information is for End User's use only and may not be sold, redistributed or otherwise used for commercial purposes  The above information is an  only  It is not intended as medical advice for individual conditions or treatments  Talk to your doctor, nurse or pharmacist before following any medical regimen to see if it is safe and effective for you

## 2021-02-09 NOTE — PHYSICAL THERAPY NOTE
PT TREATMENT     02/09/21 0945   PT Last Visit   PT Visit Date 02/09/21   Note Type   Note Type Treatment   Pain Assessment   Pain Assessment Tool 0-10   Pain Score 5   Pain Location/Orientation Orientation: Right  (lateral thigh)   Restrictions/Precautions   Weight Bearing Precautions Per Order Yes   RLE Weight Bearing Per Order WBAT   Other Precautions Fall Risk   General   Chart Reviewed Yes   Family/Caregiver Present No   Cognition   Overall Cognitive Status WFL   Arousal/Participation Cooperative   Attention Within functional limits   Orientation Level Oriented X4   Following Commands Follows all commands and directions without difficulty   Subjective   Subjective Pt was having "burning sensation at lateral thigh, but went away after walking and doing stairs  Bed Mobility   Additional Comments presents in chair   Transfers   Sit to Stand 5  Supervision   Stand to Sit 5  Supervision   Stand pivot 5  Supervision   Ambulation/Elevation   Gait pattern   (short stride on right,decreased right knee flexion)   Gait Assistance 5  Supervision   Assistive Device Rolling walker   Distance 50 feet and 30 feet    Stair Management Assistance 5  Supervision   Stair Management Technique Two rails; Step to pattern   Number of Stairs 4   Balance   Static Standing Fair +  (with RW)   Dynamic Standing Fair  (with RW)   Ambulatory Fair  (with RW)   Activity Tolerance   Activity Tolerance Patient tolerated treatment well;Patient limited by pain   Nurse Made Aware yes: Betty   Exercises   Quad Sets Sitting;10 reps;Right   Heelslides Sitting;10 reps;AAROM; Right   Knee AROM Long Arc Quad Sitting;10 reps;AAROM; Right   Ankle Pumps Sitting;20 reps;Bilateral   Balance training  with RW and on stairs    Assessment   Prognosis Good   Problem List Decreased strength;Decreased endurance; Impaired balance;Decreased mobility; Decreased skin integrity;Pain   Assessment Pt is progressing well with gait , stairs and exercises    Will continue with aftternoon session to continue to work on same  The patient's AM-PAC Basic Mobility Inpatient Short Form Raw Score is 21, Standardized Score is 45 55  A standardized score greater than 42 9 suggests the patient may benefit from discharge to home  Please also refer to the recommendation of the Physical Therapist for safe discharge planning  Goals   Patient Goals to go home; less pain in right thigh   Plan   Treatment/Interventions ADL retraining;Functional transfer training;LE strengthening/ROM; Elevations; Therapeutic exercise; Endurance training;Patient/family training;Equipment eval/education; Bed mobility;Gait training;Spoke to nursing;Spoke to MD;Spoke to case management;OT   Progress Progressing toward goals   PT Frequency Twice a day   Recommendation   PT Discharge Recommendation Other (Comment)  (home with family and OPPT)   Equipment Recommended Walker;Cane   Additional Comments Pt reports that he slept in recliner chair all night  He will stay on first floor for awhile once home until he feels stronger on the stairs  Has 15 stairs to 2nd floor  AM-PAC Basic Mobility Inpatient   Turning in Bed Without Bedrails 3   Lying on Back to Sitting on Edge of Flat Bed 3   Moving Bed to Chair 4   Standing Up From Chair 4   Walk in Room 4   Climb 3-5 Stairs 3   Basic Mobility Inpatient Raw Score 21   Basic Mobility Standardized Score 49 59   Licensure   [place] License Number  Debora Ros  [id number] [facility]  [id number]

## 2021-02-11 ENCOUNTER — TELEPHONE (OUTPATIENT)
Dept: OBGYN CLINIC | Facility: HOSPITAL | Age: 56
End: 2021-02-11

## 2021-02-11 ENCOUNTER — TELEPHONE (OUTPATIENT)
Dept: OBGYN CLINIC | Facility: CLINIC | Age: 56
End: 2021-02-11

## 2021-02-11 DIAGNOSIS — Z96.641 STATUS POST HIP REPLACEMENT, RIGHT: Primary | ICD-10-CM

## 2021-02-11 NOTE — TELEPHONE ENCOUNTER
Mellisa returned call  Patient reports that he is doing fairly well  The only really pain he is reporting is along the outer portion of his thigh  Stated that whenever he engages that muscle for any activity, even simply walking, he gets a deep burning sensation that goes from the hip to the knee  Stated this area is also swollen  Reports that when he is resting, there is no pain  Only upon movement  Encouraged continued ice 20 mins on 20 mins off  Patient stated all pain meds he is taking as prescribed and nothing is helping that pain when he is moving  Patient reports taking celebrex, tylenol, and oxycodone as recommended  Reviewed AVS with patient  He reports his dressing is still clean, dry and intact  Stated he has no redness or swelling on the hip itself  Denies and drainage  Stated he is having regular bowel movements and reports compliance with ASA DVT prophylaxis  Patient is attending PT at this time  Patient reports no swelling below the knees  Has been wearing TEDs until this point  Please advise when okay to remove TEDs and outer thigh pain patient is experiencing         Thank you

## 2021-02-11 NOTE — TELEPHONE ENCOUNTER
He can remove TEDs when swelling declines  He may then use them PRN swelling after that   I expect is thigh pain to improve

## 2021-02-11 NOTE — TELEPHONE ENCOUNTER
Patient has been informed  Advised that he may want to try heat to the thigh, 20 mins at a time  Advised no more than 20 mins and not on the hip  Ice only to the hip 20 mins on 20 mins off  Advised that this should resolve over time  Encouraged call back if any other questions or concerns arise  Verbalized understanding  Thanks!

## 2021-02-15 ENCOUNTER — TELEPHONE (OUTPATIENT)
Dept: OBGYN CLINIC | Facility: CLINIC | Age: 56
End: 2021-02-15

## 2021-02-15 NOTE — TELEPHONE ENCOUNTER
Patient called back, since his surgery on 2/8/21  Jarad Antonio is doing good walking around, getting up, no hip when sleeping or sitting, the only pain he has is in outer thigh from the knee up  Jarad Antonio explained he feels like he got hit by a truck, continued to state that it feels very tight (knotted), not a lot of sensation with light touch, when really touched he has a lot of pain  He said feels bruised, but no physical signs of bruising, no redness, not hot to touch (was warm the first 2-3 days), when he tries to engage that muscle its sharp pain and explained that he is not looking for pain medication, states it helps when sitting or laying down  He is not sure what to do about this looking for some instruction

## 2021-02-15 NOTE — TELEPHONE ENCOUNTER
Patient called and LMOM on Saturday  States he had surgery with you last Monday 2/8/21, and has some questions  I attempted to call patient back to obtain the questions he has, no answer, LMOM for him to call back

## 2021-02-16 NOTE — TELEPHONE ENCOUNTER
Spoke to patient and advised above  He stated he thinks he may be trying to work the muscle too much to speed along the healing process  Advised that if he thinks he is doing too much he should ice and elevate more often 20 mins on 20 mins off with the ice  Verbalized understanding  Advised continued PT and pain regimen as previously discussed  Verbalized understanding

## 2021-02-16 NOTE — TELEPHONE ENCOUNTER
Ice, pain protocol as previously recommended, continue PT, elevate limb at rest   This should improve his symptoms    He may call back with any further concerns or issues

## 2021-02-24 ENCOUNTER — APPOINTMENT (OUTPATIENT)
Dept: RADIOLOGY | Facility: CLINIC | Age: 56
End: 2021-02-24
Payer: COMMERCIAL

## 2021-02-24 ENCOUNTER — OFFICE VISIT (OUTPATIENT)
Dept: OBGYN CLINIC | Facility: CLINIC | Age: 56
End: 2021-02-24

## 2021-02-24 VITALS
HEART RATE: 96 BPM | WEIGHT: 214 LBS | HEIGHT: 69 IN | DIASTOLIC BLOOD PRESSURE: 84 MMHG | SYSTOLIC BLOOD PRESSURE: 132 MMHG | BODY MASS INDEX: 31.7 KG/M2

## 2021-02-24 DIAGNOSIS — M76.31 IT BAND SYNDROME, RIGHT: ICD-10-CM

## 2021-02-24 DIAGNOSIS — Z96.641 STATUS POST HIP REPLACEMENT, RIGHT: ICD-10-CM

## 2021-02-24 DIAGNOSIS — Z96.641 AFTERCARE FOLLOWING RIGHT HIP JOINT REPLACEMENT SURGERY: ICD-10-CM

## 2021-02-24 DIAGNOSIS — Z47.1 AFTERCARE FOLLOWING RIGHT HIP JOINT REPLACEMENT SURGERY: ICD-10-CM

## 2021-02-24 DIAGNOSIS — Z96.641 STATUS POST HIP REPLACEMENT, RIGHT: Primary | ICD-10-CM

## 2021-02-24 PROCEDURE — 73502 X-RAY EXAM HIP UNI 2-3 VIEWS: CPT

## 2021-02-24 PROCEDURE — 99024 POSTOP FOLLOW-UP VISIT: CPT | Performed by: ORTHOPAEDIC SURGERY

## 2021-02-24 NOTE — PROGRESS NOTES
Assessment/Plan:  1  Status post hip replacement, right  XR hip/pelv 2-3 vws right if performed     Scribe Attestation    I,:   am acting as a scribe while in the presence of the attending physician :       I,:   personally performed the services described in this documentation    as scribed in my presence :         ***    Subjective: Follow-up evaluation two weeks status post right total hip arthroplasty    Patient ID: Reyes Flores is a 54 y o  male***    Review of Systems   Constitutional: Positive for activity change  Negative for chills, fever and unexpected weight change  HENT: Negative for hearing loss, nosebleeds and sore throat  Eyes: Negative for pain, redness and visual disturbance  Respiratory: Negative for cough, shortness of breath and wheezing  Cardiovascular: Negative for chest pain, palpitations and leg swelling  Gastrointestinal: Negative for abdominal pain, nausea and vomiting  Endocrine: Negative for polyphagia and polyuria  Genitourinary: Negative for dysuria and hematuria  Musculoskeletal: Positive for joint swelling and myalgias  Negative for arthralgias  See HPI   Skin: Negative for rash and wound  Neurological: Negative for dizziness, numbness and headaches  Psychiatric/Behavioral: Negative for decreased concentration and suicidal ideas  The patient is not nervous/anxious            Past Medical History:   Diagnosis Date    Arthritis     Hypercholesteremia     Kidney stone     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 11/19/2020       Past Surgical History:   Procedure Laterality Date    COLONOSCOPY      CYSTOSCOPY      KIDNEY STONE SURGERY      [place] TOTAL HIP ARTHROPLASTY Right 2/8/2021    Procedure: ARTHROPLASTY HIP TOTAL ANTERIOR;  Surgeon: Jesenia Zafar DO;  Location: [facility];  Service: Orthopedics       Family History   Problem Relation Age of Onset    No Known Problems Mother     No Known Problems Father        Social History Occupational History    Not on file   Tobacco Use    Smoking status: Never Smoker    Smokeless tobacco: Never Used   Substance and Sexual Activity    Alcohol use: Yes     Comment: occasionally    Drug use: Never    Sexual activity: Not on file         Current Outpatient Medications:     aspirin 325 mg tablet, Take 1 tablet (325 mg total) by mouth 2 (two) times a day, Disp: 84 tablet, Rfl: 0    atorvastatin (LIPITOR) 20 mg tablet, Take 20 mg by mouth daily, Disp: , Rfl:     docusate sodium (COLACE) 100 mg capsule, Take 1 capsule (100 mg total) by mouth 2 (two) times a day, Disp: 60 capsule, Rfl: 0    acetaminophen (TYLENOL) 325 mg tablet, Take 3 tablets (975 mg total) by mouth every 8 (eight) hours (Patient not taking: Reported on 2/24/2021), Disp:  , Rfl: 0    celecoxib (CeleBREX) 100 mg capsule, Take 1 capsule (100 mg total) by mouth 2 (two) times a day (Patient not taking: Reported on 2/24/2021), Disp: 28 capsule, Rfl: 0    oxyCODONE (ROXICODONE) 5 mg immediate release tablet, Take 1-2 tablets by mouth every 4-6 hours as needed for moderate to severe pain (Patient not taking: Reported on 2/24/2021), Disp: 60 tablet, Rfl: 0    No Known Allergies    Objective: There were no vitals filed for this visit  Body mass index is 32 07 kg/m²  Ortho Exam    Physical Exam  Vitals signs and nursing note reviewed  Constitutional:       Appearance: He is well-developed  HENT:      Head: Normocephalic and atraumatic  Eyes:      General: No scleral icterus  Conjunctiva/sclera: Conjunctivae normal    Neck:      Musculoskeletal: Normal range of motion and neck supple  Cardiovascular:      Rate and Rhythm: Normal rate  Pulmonary:      Effort: Pulmonary effort is normal  No respiratory distress  Musculoskeletal:      Comments: As noted in HPI   Skin:     General: Skin is warm and dry  Neurological:      Mental Status: He is alert and oriented to person, place, and time     Psychiatric: Behavior: Behavior normal          I have personally reviewed pertinent films in PACS  X-ray of the right hip obtained on 02/24/2021 reviewed demonstrating well-positioned and aligned total hip arthroplasty without evidence of failure  There is no new fracture, dislocation, lytic or blastic lesion

## 2021-02-24 NOTE — PROGRESS NOTES
Assessment/Plan:  1  Status post hip replacement, right  XR hip/pelv 2-3 vws right if performed   2  Aftercare following right hip joint replacement surgery     3  It band syndrome, right       Marcos De Luna is a pleasant 54-year-old presenting today 2 weeks after undergoing a direct anterior right total hip arthroplasty  He is doing very well in his recovery at this point  His prosthesis is stable on imaging and exam   His incision is healing nicely and there is no concern for infection  He may now get it wet in the shower, but should avoid direct scrubbing or saturation  There is no concern for DVT and he should continue his aspirin twice a day for the next 4 weeks  We are very pleased with his progress physical therapy  They may continue to address his IT band syndrome, which is likely due to the restoration his natural leg length and offset, but we expect this to improve with time and therapy  We will plan to see him back in 4 weeks for a clinical re-evaluation, he will not need new x-rays  He and his wife expressed understanding all of his questions were addressed today    Subjective: 2 weeks s/p DA right DEANNA    Patient ID: Jeanie Camargo is a 54 y o  male  Marcos De Luna is a pleasant 54-year-old gentleman presenting today 2 weeks after undergoing a direct anterior right total hip arthroplasty  He reports that he is doing very well  He does not have any significant right hip or groin pain  He does have activity related discomfort in the lateral right knee, which is daughter and physical therapist believe is his IT band  He has not required any oxycodone or Tylenol  He completed Celebrex and has been taking aspirin twice a day for DVT prophylaxis  He denies any calf pain  He denies any paresthesias or new injury    Review of Systems   Constitutional: Negative  HENT: Negative  Eyes: Negative  Respiratory: Negative  Cardiovascular: Negative  Gastrointestinal: Negative      Endocrine: Negative  Genitourinary: Negative  Musculoskeletal: Positive for arthralgias, joint swelling and myalgias  Skin: Negative  Allergic/Immunologic: Negative  Neurological: Negative  Hematological: Negative  Psychiatric/Behavioral: Negative          Past Medical History:   Diagnosis Date    Arthritis     Hypercholesteremia     Kidney stone     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 11/19/2020       Past Surgical History:   Procedure Laterality Date    COLONOSCOPY      CYSTOSCOPY      KIDNEY STONE SURGERY      [place] TOTAL HIP ARTHROPLASTY Right 2/8/2021    Procedure: ARTHROPLASTY HIP TOTAL ANTERIOR;  Surgeon: Lisa Pink DO;  Location: [facility];  Service: Orthopedics       Family History   Problem Relation Age of Onset    No Known Problems Mother     No Known Problems Father        Social History     Occupational History    Not on file   Tobacco Use    Smoking status: Never Smoker    Smokeless tobacco: Never Used   Substance and Sexual Activity    Alcohol use: Yes     Comment: occasionally    Drug use: Never    Sexual activity: Not on file         Current Outpatient Medications:     aspirin 325 mg tablet, Take 1 tablet (325 mg total) by mouth 2 (two) times a day, Disp: 84 tablet, Rfl: 0    atorvastatin (LIPITOR) 20 mg tablet, Take 20 mg by mouth daily, Disp: , Rfl:     docusate sodium (COLACE) 100 mg capsule, Take 1 capsule (100 mg total) by mouth 2 (two) times a day, Disp: 60 capsule, Rfl: 0    acetaminophen (TYLENOL) 325 mg tablet, Take 3 tablets (975 mg total) by mouth every 8 (eight) hours (Patient not taking: Reported on 2/24/2021), Disp:  , Rfl: 0    oxyCODONE (ROXICODONE) 5 mg immediate release tablet, Take 1-2 tablets by mouth every 4-6 hours as needed for moderate to severe pain (Patient not taking: Reported on 2/24/2021), Disp: 60 tablet, Rfl: 0    No Known Allergies    Objective:  Vitals:    02/24/21 1149   BP: 132/84   Pulse: 96       Body mass index is 32 07 kg/m²  Right Hip Exam     Tenderness   The patient is experiencing tenderness in the lateral (distal IT band)  Range of Motion   Abduction:  30 normal   Adduction:  20 normal   Extension:  0 normal   Flexion:  100 normal   External rotation:  40 normal   Internal rotation:  20 normal     Muscle Strength   Abduction: 5/5   Adduction: 5/5   Flexion: 5/5     Tests   DEVI: negative  Enzo: negative    Other   Erythema: absent  Scars: present  Sensation: normal  Pulse: present    Comments:  Incision well approximated without evidence of erythema, ecchymosis, drainage, dehiscence, or other sign of infection   thigh and calf soft and nontender  No significant edema   Negative Homans   Grossly distally neurovascularly  Tender distal IT band  Ambulates with slightly antalgic gait on right with cane            Physical Exam  Vitals signs and nursing note reviewed  Constitutional:       Appearance: He is well-developed  Comments: Body mass index is 32 07 kg/m²  HENT:      Head: Normocephalic and atraumatic  Right Ear: External ear normal       Left Ear: External ear normal    Eyes:      Extraocular Movements: Extraocular movements intact  Neck:      Musculoskeletal: Normal range of motion  Cardiovascular:      Rate and Rhythm: Normal rate  Pulmonary:      Effort: Pulmonary effort is normal    Abdominal:      Palpations: Abdomen is soft  Musculoskeletal:      Comments: See ortho exam   Skin:     General: Skin is warm and dry  Neurological:      Mental Status: He is alert and oriented to person, place, and time  Mental status is at baseline  Psychiatric:         Mood and Affect: Mood normal          Behavior: Behavior normal          Thought Content: Thought content normal          Judgment: Judgment normal        I have personally reviewed pertinent films in PACS of the x-rays taken today of his pelvis and right hip and compared them to previous films   There has been no changes of the total hip prosthesis which demonstrate appropriate version the cross-table lateral of the acetabular component    Leg lengths and offset appear equal

## 2021-03-24 ENCOUNTER — OFFICE VISIT (OUTPATIENT)
Dept: OBGYN CLINIC | Facility: CLINIC | Age: 56
End: 2021-03-24

## 2021-03-24 VITALS
DIASTOLIC BLOOD PRESSURE: 80 MMHG | WEIGHT: 215.2 LBS | SYSTOLIC BLOOD PRESSURE: 130 MMHG | HEIGHT: 69 IN | BODY MASS INDEX: 31.87 KG/M2 | HEART RATE: 82 BPM

## 2021-03-24 DIAGNOSIS — Z96.641 STATUS POST HIP REPLACEMENT, RIGHT: Primary | ICD-10-CM

## 2021-03-24 DIAGNOSIS — Z96.641 AFTERCARE FOLLOWING RIGHT HIP JOINT REPLACEMENT SURGERY: ICD-10-CM

## 2021-03-24 DIAGNOSIS — Z47.1 AFTERCARE FOLLOWING RIGHT HIP JOINT REPLACEMENT SURGERY: ICD-10-CM

## 2021-03-24 DIAGNOSIS — M76.31 IT BAND SYNDROME, RIGHT: ICD-10-CM

## 2021-03-24 PROCEDURE — 99024 POSTOP FOLLOW-UP VISIT: CPT | Performed by: ORTHOPAEDIC SURGERY

## 2021-03-24 NOTE — PROGRESS NOTES
Assessment/Plan:  1  Status post hip replacement, right     2  Aftercare following right hip joint replacement surgery     3  It band syndrome, right        neck is a very pleasant 70-year-old gentleman presenting today 6 weeks after undergoing a direct anterior right total hip arthroplasty  He is doing very well in his recovery at this time  His incision has healed very nicely and he no longer has any restrictions regarding bathing or swimming  He continues to have tightness and discomfort of the IT band, but this does seem to be improving  We encouraged him to continue with physical therapy and his home exercise program to stretch this tissue  He may also use Voltaren gel up to 4 times a day on the affected area  He may return to driving at his convenience  He has also completed aspirin for DVT prophylaxis  We would like to see him back in  Six weeks with x-rays on arrival of his right hip  He is welcome to call with any questions or concerns in the interim  All of his and his wife's questions were addressed today    Subjective: Six weeks status post direct anterior right total hip arthroplasty    Patient ID: Josiane Perla is a 64 y o  male  Neck is a pleasant 70-year-old today for follow-up 6 weeks after undergoing a direct anterior right total arthroplasty  Reports that he is doing very well this time  He does not have any activity related in the groin  All of his activity is still in the IT band, primarily distally  He is not any pain medication  He did complete aspirin DVT prophylaxis  He is still working with physical therapy twice a week  He does feel the IT band pain is getting better and he is able to navigate up and down stairs without pain  Review of Systems   Constitutional: Negative  HENT: Negative  Eyes: Negative  Respiratory: Negative  Cardiovascular: Negative  Gastrointestinal: Negative  Endocrine: Negative  Genitourinary: Negative      Musculoskeletal: Positive for myalgias  Skin: Negative  Allergic/Immunologic: Negative  Neurological: Negative  Hematological: Negative  Psychiatric/Behavioral: Negative            Past Medical History:   Diagnosis Date    Arthritis     Hypercholesteremia     Kidney stone     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 11/19/2020       Past Surgical History:   Procedure Laterality Date    COLONOSCOPY      CYSTOSCOPY      KIDNEY STONE SURGERY      [place] TOTAL HIP ARTHROPLASTY Right 2/8/2021    Procedure: ARTHROPLASTY HIP TOTAL ANTERIOR;  Surgeon: Ashok Lockhart DO;  [facility];  Service: Orthopedics       Family History   Problem Relation Age of Onset    No Known Problems Mother     No Known Problems Father        Social History     Occupational History    Not on file   Tobacco Use    Smoking status: Never Smoker    Smokeless tobacco: Never Used   Substance and Sexual Activity    Alcohol use: Yes     Comment: occasionally    Drug use: Never    Sexual activity: Not on file         Current Outpatient Medications:     atorvastatin (LIPITOR) 20 mg tablet, Take 20 mg by mouth daily, Disp: , Rfl:     acetaminophen (TYLENOL) 325 mg tablet, Take 3 tablets (975 mg total) by mouth every 8 (eight) hours (Patient not taking: Reported on 2/24/2021), Disp:  , Rfl: 0    aspirin 325 mg tablet, Take 1 tablet (325 mg total) by mouth 2 (two) times a day (Patient not taking: Reported on 3/24/2021), Disp: 84 tablet, Rfl: 0    docusate sodium (COLACE) 100 mg capsule, Take 1 capsule (100 mg total) by mouth 2 (two) times a day (Patient not taking: Reported on 3/24/2021), Disp: 60 capsule, Rfl: 0    oxyCODONE (ROXICODONE) 5 mg immediate release tablet, Take 1-2 tablets by mouth every 4-6 hours as needed for moderate to severe pain (Patient not taking: Reported on 2/24/2021), Disp: 60 tablet, Rfl: 0    No Known Allergies    Objective:  Vitals:    03/24/21 0902   BP: 130/80   Pulse: 82       Body mass index is 32 25 kg/m²  Right Hip Exam     Tenderness   The patient is experiencing tenderness in the lateral (distal IT band)  Range of Motion   Abduction:  40 normal   Adduction:  25 normal   Extension:  0 normal   Flexion:  110 normal   External rotation:  40 normal   Internal rotation:  20 normal     Muscle Strength   Abduction: 5/5   Adduction: 5/5   Flexion: 5/5     Tests   DEVI: negative  Enzo: negative    Other   Erythema: absent  Scars: present  Sensation: normal  Pulse: present    Comments:  Incision well healed  Thigh and calf soft and nontender  No significant edema   Grossly distally neurovascularly  Tender IT band, primarily distally  Ambulates with normal symmetrical gait            Physical Exam  Vitals signs and nursing note reviewed  Constitutional:       Appearance: He is well-developed  Comments: Body mass index is 32 25 kg/m²  HENT:      Head: Normocephalic and atraumatic  Right Ear: External ear normal       Left Ear: External ear normal    Neck:      Musculoskeletal: Normal range of motion  Cardiovascular:      Rate and Rhythm: Normal rate  Pulmonary:      Effort: Pulmonary effort is normal    Abdominal:      Palpations: Abdomen is soft  Musculoskeletal:      Comments: See ortho exam   Skin:     General: Skin is warm and dry  Neurological:      Mental Status: He is alert and oriented to person, place, and time  Psychiatric:         Behavior: Behavior normal          Thought Content:  Thought content normal          Judgment: Judgment normal

## 2021-05-05 ENCOUNTER — OFFICE VISIT (OUTPATIENT)
Dept: OBGYN CLINIC | Facility: CLINIC | Age: 56
End: 2021-05-05

## 2021-05-05 ENCOUNTER — APPOINTMENT (OUTPATIENT)
Dept: RADIOLOGY | Facility: CLINIC | Age: 56
End: 2021-05-05
Payer: COMMERCIAL

## 2021-05-05 VITALS
DIASTOLIC BLOOD PRESSURE: 82 MMHG | HEART RATE: 76 BPM | WEIGHT: 212 LBS | HEIGHT: 69 IN | BODY MASS INDEX: 31.4 KG/M2 | SYSTOLIC BLOOD PRESSURE: 120 MMHG

## 2021-05-05 DIAGNOSIS — Z96.641 STATUS POST HIP REPLACEMENT, RIGHT: Primary | ICD-10-CM

## 2021-05-05 DIAGNOSIS — Z47.1 AFTERCARE FOLLOWING RIGHT HIP JOINT REPLACEMENT SURGERY: ICD-10-CM

## 2021-05-05 DIAGNOSIS — Z96.641 STATUS POST HIP REPLACEMENT, RIGHT: ICD-10-CM

## 2021-05-05 DIAGNOSIS — Z96.641 AFTERCARE FOLLOWING RIGHT HIP JOINT REPLACEMENT SURGERY: ICD-10-CM

## 2021-05-05 PROCEDURE — 73502 X-RAY EXAM HIP UNI 2-3 VIEWS: CPT

## 2021-05-05 PROCEDURE — 99024 POSTOP FOLLOW-UP VISIT: CPT | Performed by: ORTHOPAEDIC SURGERY

## 2021-05-05 NOTE — PROGRESS NOTES
Assessment/Plan:  1  Status post hip replacement, right  XR hip/pelv 2-3 vws right if performed   2  Aftercare following right hip joint replacement surgery       Diogo Forte is a pleasant 60-year-old gentleman presenting today for follow-up 3 months after undergoing a direct anterior right total hip arthroplasty  He has done exceptionally well in his recovery  The prosthesis is stable on imaging and exam   He is pain-free with activities of daily living  The residual paresthesias of his lateral thigh are minimal and do not affect his quality of life or activities  Hopefully these will resolve over the next 3 months  He may return to all activities without restriction, but  Understands to avoid repetitive heavy lifting  He also understands the need of antibiotic prophylaxis before any dental or GI procedures for the rest of his life  He will call us he has any upcoming appointments  He and his wife are planning to move to [place] in the near future  If he is here in 9 months, he can follow-up with x-rays on arrival of his right hip for the anniversary of his surgery  Otherwise, we would like him to follow-up at the [address] [place] [address] if he has any issues with his hip  He and his wife expressed understanding all of her questions were addressed today  Subjective: 3 months s/p DA right DEANNA    Patient ID: Patsy Coleman is a 64 y o  male  Diogo Forte is a pleasant 60-year-old gentleman presenting today for follow-up 3 months after undergoing a direct anterior right total hip arthroplasty  He reports that he is doing very well and very pleased with his outcome  Does not have any activity related pain in the groin  Does have some paresthesias of the lateral right thigh, but otherwise is completely asymptomatic  He has been doing his home exercise program   He denies any new injuries or symptoms    Review of Systems   Constitutional: Negative  HENT: Negative  Eyes: Negative      Respiratory: Negative  Cardiovascular: Negative  Gastrointestinal: Negative  Endocrine: Negative  Genitourinary: Negative  Musculoskeletal: Negative  Skin: Negative  Allergic/Immunologic: Negative  Neurological: Positive for numbness  Hematological: Negative  Psychiatric/Behavioral: Negative  Past Medical History:   Diagnosis Date    Arthritis     Hypercholesteremia     Kidney stone     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 11/19/2020       Past Surgical History:   Procedure Laterality Date    COLONOSCOPY      CYSTOSCOPY      KIDNEY STONE SURGERY      [place] TOTAL HIP ARTHROPLASTY Right 2/8/2021    Procedure: ARTHROPLASTY HIP TOTAL ANTERIOR;  Surgeon: Otis Acuña DO;  Location: [facility];  Service: Orthopedics       Family History   Problem Relation Age of Onset    No Known Problems Mother     No Known Problems Father        Social History     Occupational History    Not on file   Tobacco Use    Smoking status: Never Smoker    Smokeless tobacco: Never Used   Substance and Sexual Activity    Alcohol use: Yes     Comment: occasionally    Drug use: Never    Sexual activity: Not on file         Current Outpatient Medications:     atorvastatin (LIPITOR) 20 mg tablet, Take 20 mg by mouth daily, Disp: , Rfl:     No Known Allergies    Objective:  Vitals:    05/05/21 1049   BP: 120/82   Pulse: 76       Body mass index is 31 77 kg/m²  Right Hip Exam     Tenderness   The patient is experiencing no tenderness       Range of Motion   Abduction:  40 normal   Adduction:  25 normal   Extension:  0 normal   Flexion:  110 normal   External rotation:  40 normal   Internal rotation:  20 normal     Muscle Strength   Abduction: 5/5   Adduction: 5/5   Flexion: 5/5     Tests   DEVI: negative  Enzo: negative    Other   Erythema: absent  Scars: present  Sensation: normal  Pulse: present    Comments:  Incision well healed  Thigh and calf soft and nontender  No significant edema Grossly distally neurovascularly  Very mild dysthesias right lateral thigh  Ambulates with normal symmetrical gait            Physical Exam  Vitals signs and nursing note reviewed  Constitutional:       Appearance: He is well-developed  Comments: Body mass index is 31 77 kg/m²  HENT:      Head: Normocephalic and atraumatic  Right Ear: External ear normal       Left Ear: External ear normal    Neck:      Musculoskeletal: Normal range of motion  Cardiovascular:      Rate and Rhythm: Normal rate  Pulmonary:      Effort: Pulmonary effort is normal    Abdominal:      Palpations: Abdomen is soft  Musculoskeletal:      Comments: See ortho exam   Skin:     General: Skin is warm and dry  Neurological:      Mental Status: He is alert and oriented to person, place, and time  Psychiatric:         Behavior: Behavior normal          Thought Content: Thought content normal          Judgment: Judgment normal          I have personally reviewed pertinent films in PACS of the x-rays taken today of his pelvis and right hip compared them to previous films  There has been no change in alignment of the total hip prosthesis which appears well integrated and well affixed  There is no evidence of loosening, periprosthetic fracture, or other complication    Leg lengths appear equal

## 2021-08-17 DIAGNOSIS — Z96.641 STATUS POST HIP REPLACEMENT, RIGHT: Primary | ICD-10-CM

## 2021-08-17 RX ORDER — AMOXICILLIN 500 MG/1
2000 TABLET, FILM COATED ORAL
Qty: 4 TABLET | Refills: 2 | Status: SHIPPED | OUTPATIENT
Start: 2021-08-17 | End: 2021-11-15

## 2021-08-17 NOTE — TELEPHONE ENCOUNTER
Patient is calling for abx for dental visit  Patient had rt hip sx on 2/8/21  Patient has dentist appt on 8/26/21  Please send to [address] in [address] [place] [address]  Call patient with any questions at [phone]

## 2022-06-24 DIAGNOSIS — Z96.641 AFTERCARE FOLLOWING RIGHT HIP JOINT REPLACEMENT SURGERY: Primary | ICD-10-CM

## 2022-06-24 DIAGNOSIS — Z96.641 STATUS POST HIP REPLACEMENT, RIGHT: ICD-10-CM

## 2022-06-24 DIAGNOSIS — Z47.1 AFTERCARE FOLLOWING RIGHT HIP JOINT REPLACEMENT SURGERY: Primary | ICD-10-CM

## 2022-06-24 RX ORDER — AMOXICILLIN 500 MG/1
2000 TABLET, FILM COATED ORAL
Qty: 4 TABLET | Refills: 1 | Status: SHIPPED | OUTPATIENT
Start: 2022-06-24 | End: 2022-06-25

## 2022-06-24 NOTE — TELEPHONE ENCOUNTER
Pt called and left voicemail on clinical line for a prescription for antibiotics  Please send to pharmacy on file    C/b9 [phone]

## 2023-02-08 NOTE — TELEPHONE ENCOUNTER
[facility] IS REQUESTING AN UPDATED SCRIPT FOR PATIENTS PT    PLEASE FAX TO Kori Mcgee [phone]
Please provide
You were referred to [facility] for home care services. The visiting RN will call to schedule a visit

## (undated) DEVICE — ANTIBACTERIAL UNDYED BRAIDED (POLYGLACTIN 910), SYNTHETIC ABSORBABLE SUTURE: Brand: COATED VICRYL

## (undated) DEVICE — DRESSING MEPILEX AG BORDER 4 X 8 IN

## (undated) DEVICE — LIGHT GLOVE GREEN

## (undated) DEVICE — INSTRUMENT POUCH: Brand: CONVERTORS

## (undated) DEVICE — INTENDED FOR TISSUE SEPARATION, AND OTHER PROCEDURES THAT REQUIRE A SHARP SURGICAL BLADE TO PUNCTURE OR CUT.: Brand: BARD-PARKER ® CARBON RIB-BACK BLADES

## (undated) DEVICE — SUT STRATAFIX SPIRAL 1-0  CT-1 30 X 30CM SXPD2B403

## (undated) DEVICE — GLOVE INDICATOR PI UNDERGLOVE SZ 8.5 BLUE

## (undated) DEVICE — PACK MAJOR ORTHO W/SPLITS PBDS

## (undated) DEVICE — GLOVE SRG BIOGEL 8.5

## (undated) DEVICE — GLOVE SRG BIOGEL 8

## (undated) DEVICE — ARTHROSCOPY FLOOR MAT

## (undated) DEVICE — ASTOUND SURGICAL GOWN, XXX LARGE, X-LONG: Brand: CONVERTORS

## (undated) DEVICE — GLOVE INDICATOR PI UNDERGLOVE SZ 7.5 BLUE

## (undated) DEVICE — ELECTRODE BLADE MOD  E-Z CLEAN 6.5IN -0014M

## (undated) DEVICE — SUT STRATAFIX SPIRAL 3-0 PGA/PCL 30 X 30 CM SXMD2B410

## (undated) DEVICE — TRAY FOLEY 16FR URIMETER SURESTEP

## (undated) DEVICE — POSITIONER HANA TABLE PACK

## (undated) DEVICE — VEST SURGEON DISPOSABLE

## (undated) DEVICE — SUT ETHIBOND 2 V-37 30 IN MX69G

## (undated) DEVICE — SUT VICRYL 0 CP-1 27 IN J267H

## (undated) DEVICE — SKIN MARKER DUAL TIP WITH RULER CAP, FLEXIBLE RULER AND LABELS: Brand: DEVON

## (undated) DEVICE — DRAPE C-ARM X-RAY

## (undated) DEVICE — TUBE MINI KAMVAC SUCTION 7310 7 LATEX FREE

## (undated) DEVICE — HANDPIECE SET WITH HIGH FLOW TIP AND SUCTION TUBE: Brand: INTERPULSE

## (undated) DEVICE — ADHESIVE SKIN CLOSR DERMABOND PRINEO

## (undated) DEVICE — HOOD: Brand: FLYTE, SURGICOOL

## (undated) DEVICE — REPEL CUT REST SURGICAL GLV LNRS LG: Brand: REPEL

## (undated) DEVICE — FOOT SWITCH DRAPE: Brand: UNBRANDED

## (undated) DEVICE — PAD CAST 4 IN COTTON NON STERILE

## (undated) DEVICE — PREP SURGICAL PURPREP 26ML

## (undated) DEVICE — 3M™ IOBAN™ 2 ANTIMICROBIAL INCISE DRAPE 6650EZ: Brand: IOBAN™ 2

## (undated) DEVICE — OSCILLATING TIP SAW CARTRIDGE: Brand: PRECISION FALCON

## (undated) DEVICE — CHLORAPREP HI-LITE 26ML ORANGE

## (undated) DEVICE — CAPIT HIP COP - ACTIS ONLY

## (undated) DEVICE — TIBURON SPLIT SHEET: Brand: CONVERTORS

## (undated) DEVICE — 3M™ STERI-DRAPE™ U-DRAPE 1015: Brand: STERI-DRAPE™

## (undated) DEVICE — BIPOLAR SEALER 23-113-1 AQM 2.3: Brand: AQUAMANTYS™

## (undated) DEVICE — BASIC DOUBLE BASIN 2-LF: Brand: MEDLINE INDUSTRIES, INC.

## (undated) DEVICE — DRAPE SHEET X-LG